# Patient Record
Sex: FEMALE | Race: WHITE | Employment: FULL TIME | URBAN - METROPOLITAN AREA
[De-identification: names, ages, dates, MRNs, and addresses within clinical notes are randomized per-mention and may not be internally consistent; named-entity substitution may affect disease eponyms.]

---

## 2017-02-17 DIAGNOSIS — Z79.4 UNCONTROLLED TYPE 2 DIABETES MELLITUS WITH HYPERGLYCEMIA, WITH LONG-TERM CURRENT USE OF INSULIN (HCC): ICD-10-CM

## 2017-02-17 DIAGNOSIS — E11.65 UNCONTROLLED TYPE 2 DIABETES MELLITUS WITH HYPERGLYCEMIA, WITH LONG-TERM CURRENT USE OF INSULIN (HCC): ICD-10-CM

## 2017-04-04 ENCOUNTER — TELEPHONE (OUTPATIENT)
Dept: ENDOCRINOLOGY | Age: 42
End: 2017-04-04

## 2017-04-04 DIAGNOSIS — Z79.4 UNCONTROLLED TYPE 2 DIABETES MELLITUS WITH HYPERGLYCEMIA, WITH LONG-TERM CURRENT USE OF INSULIN (HCC): ICD-10-CM

## 2017-04-04 DIAGNOSIS — E11.65 UNCONTROLLED TYPE 2 DIABETES MELLITUS WITH HYPERGLYCEMIA, WITH LONG-TERM CURRENT USE OF INSULIN (HCC): ICD-10-CM

## 2017-04-04 RX ORDER — INSULIN GLARGINE 100 [IU]/ML
INJECTION, SOLUTION SUBCUTANEOUS
Qty: 15 ML | Refills: 6 | Status: SHIPPED | OUTPATIENT
Start: 2017-04-04 | End: 2017-04-10 | Stop reason: CLARIF

## 2017-04-06 NOTE — TELEPHONE ENCOUNTER
Pt insurance is not covering the lantus; she needs levemir or tresiba sent in. She also asked if the needles can be sent as well.

## 2017-04-07 RX ORDER — INSULIN GLARGINE 100 [IU]/ML
INJECTION, SOLUTION SUBCUTANEOUS
Qty: 15 ML | Refills: 6 | OUTPATIENT
Start: 2017-04-07

## 2017-04-11 RX ORDER — INSULIN DEGLUDEC 200 U/ML
10-16 INJECTION, SOLUTION SUBCUTANEOUS
Qty: 15 ML | Refills: 0 | Status: SHIPPED | OUTPATIENT
Start: 2017-04-11 | End: 2017-08-30

## 2017-04-11 NOTE — TELEPHONE ENCOUNTER
Lantus no longer covered by insurance. Preferred insulin Brenna Brewer or Candida. Patient is completely out of insulin.

## 2017-08-30 ENCOUNTER — OFFICE VISIT (OUTPATIENT)
Dept: OBGYN CLINIC | Age: 42
End: 2017-08-30

## 2017-08-30 VITALS
HEIGHT: 72 IN | DIASTOLIC BLOOD PRESSURE: 66 MMHG | RESPIRATION RATE: 19 BRPM | SYSTOLIC BLOOD PRESSURE: 103 MMHG | BODY MASS INDEX: 38.87 KG/M2 | HEART RATE: 79 BPM | WEIGHT: 287 LBS

## 2017-08-30 DIAGNOSIS — Z01.419 WELL FEMALE EXAM WITH ROUTINE GYNECOLOGICAL EXAM: ICD-10-CM

## 2017-08-30 DIAGNOSIS — Z11.51 SPECIAL SCREENING EXAMINATION FOR HUMAN PAPILLOMAVIRUS (HPV): ICD-10-CM

## 2017-08-30 DIAGNOSIS — Z11.3 SCREEN FOR STD (SEXUALLY TRANSMITTED DISEASE): ICD-10-CM

## 2017-08-30 DIAGNOSIS — Z12.31 SCREENING MAMMOGRAM, ENCOUNTER FOR: Primary | ICD-10-CM

## 2017-08-30 RX ORDER — INSULIN GLARGINE 100 [IU]/ML
INJECTION, SOLUTION SUBCUTANEOUS
Refills: 6 | COMMUNITY
Start: 2017-07-21 | End: 2017-11-10 | Stop reason: ALTCHOICE

## 2017-08-30 RX ORDER — NORGESTIMATE AND ETHINYL ESTRADIOL 0.25-0.035
1 KIT ORAL DAILY
Qty: 3 DOSE PACK | Refills: 3 | Status: SHIPPED | OUTPATIENT
Start: 2017-08-30 | End: 2018-06-15 | Stop reason: ALTCHOICE

## 2017-08-30 RX ORDER — PANTOPRAZOLE SODIUM 40 MG/1
TABLET, DELAYED RELEASE ORAL
Refills: 1 | Status: ON HOLD | COMMUNITY
Start: 2017-07-24 | End: 2020-02-04

## 2017-08-30 NOTE — PROGRESS NOTES
Mauricio Whitfield is a No obstetric history on file. ,  40 y.o. female [facility] whose LMP was on 8/9/2017 who presents for her annual checkup. She is having significant desire for contraception. She also requests STD testing today. Menstrual status:    Her periods are moderate in flow. She is using three to five pads or tampons per day, usually regular and last 26-30 days. She denies dysmenorrhea. She reports no premenstrual symptoms. The patient is not using HRT. Contraception:    The current method of family planning is none. Sexual history:    She  has no sexual activity history on file. Medical conditions:    Since her last annual GYN exam about three or more years ago, she has had the following changes in her health history: gastric sleeve and she has lost 70 pounds      Pap and Mammogram History:    Her most recent Pap smear was negative and HPV positive obtained 3 year(s) ago. The patient has never had a mammogram.    Breast Cancer History/Substance Abuse:    She has no family history of breast cancer. Osteoporosis History:    Family history does not include a first or second degree relative with osteopenia or osteoporosis. A bone density scan has not been previously obtained. Past Medical History:   Diagnosis Date    Broken foot 11/11    DM [facility]diabetes mellitus) ([facility] [address]     Headache      Past Surgical History:   Procedure Laterality Date    HX OTHER SURGICAL  10/2016    gastric sleeve     HX OVARIAN CYST REMOVAL      HX TONSILLECTOMY       Current Outpatient Prescriptions   Medication Sig Dispense Refill    norgestimate-ethinyl estradiol ([facility], ,) 0.25-35 mg-mcg tab Take 1 Tab by mouth daily. 3 Dose Pack 3    olmesartan-hydroCHLOROthiazide (BENICAR HCT) 40-12.5 mg per tablet TAKE 1 TABLET BY MOUTH EVERY DAY 90 Tab 6    spironolactone (ALDACTONE) 50 mg tablet Take 1 Tab by mouth two (2) times a day.  180 Tab 4    glucose blood VI test strips (ONETOUCH ULTRA TEST) strip Use to check blood sugar 4 times daily. Dx code E11.65 200 Strip 11    Lancets (ONE TOUCH DELICA) misc Use to check blood sugar 4 times daily. Dx code E11.65 200 Each 11    Blood-Glucose Meter (ONETOUCH ULTRAMINI) monitoring kit Use to check blood sugar 4 times daily. Dx code E11.65 1 Kit 0    BD INSULIN PEN NEEDLE UF SHORT 31 X 5/16 \" ndle USE AS DIRECTED 4 TIMES A  Package 6    pantoprazole (PROTONIX) 40 mg tablet TK 1 T PO D  1    LANTUS SOLOSTAR 100 unit/mL (3 mL) inpn ADM 10 TO 16 UNITS SC HS  6    insulin degludec (TRESIBA FLEXTOUCH U-200) 200 unit/mL (3 mL) inpn 10-16 Units by SubCUTAneous route nightly. 15 mL 0    omeprazole (PRILOSEC) 40 mg capsule Take 1 Cap by mouth daily. 0    fluconazole (DIFLUCAN) 150 mg tablet Take 1 tablet by mouth for three days 3 tablet 1     Allergies: Review of patient's allergies indicates no known allergies. Social History     Social History    Marital status: SINGLE     Spouse name: N/A    Number of children: N/A    Years of education: N/A     Occupational History    Not on file. Social History Main Topics    Smoking status: Never Smoker    Smokeless tobacco: Never Used    Alcohol use Yes      Comment: socially    Drug use: No    Sexual activity: Not on file     Other Topics Concern    Not on file     Social History Narrative     Tobacco History:  reports that she has never smoked. She has never used smokeless tobacco.  Alcohol Abuse:  reports that she drinks alcohol. Drug Abuse:  reports that she does not use illicit drugs.   Patient Active Problem List   Diagnosis Code    DM (diabetes mellitus) ([facility] [address] E11.9    Headache R51    Encounter for long-term (current) use of insulin ([facility] [address] Z79.4    Hypertriglyceridemia E78.1    Essential hypertension I10    BMI 45.0-49.9, adult ([facility] [address] Z68.42         Review of Systems - History obtained from the patient  Constitutional: negative for weight loss, fever, night sweats  HEENT: negative for hearing loss, earache, congestion, snoring, sorethroat  CV: negative for chest pain, palpitations, edema  Resp: negative for cough, shortness of breath, wheezing  GI: negative for change in bowel habits, abdominal pain, black or bloody stools  : negative for frequency, dysuria, hematuria, vaginal discharge  MSK: negative for back pain, joint pain, muscle pain  Breast: negative for breast lumps, nipple discharge, galactorrhea  Skin :negative for itching, rash, hives  Neuro: negative for dizziness, headache, confusion, weakness  Psych: negative for anxiety, depression, change in mood  Heme/lymph: negative for bleeding, bruising, pallor    Physical Exam    Visit Vitals    /66 (BP 1 Location: Left arm, BP Patient Position: Sitting)    Pulse 79    Resp 19    Ht 6' 1\" (1.854 m)    Wt 287 lb (130.2 kg)    LMP 08/09/2017 (Exact Date)    BMI 37.87 kg/m2     Constitutional  · Appearance: well-nourished, well developed, alert, in no acute distress    HENT  · Head and Face: appears normal    Neck  · Inspection/Palpation: normal appearance, no masses or tenderness  Lymph Nodes: no lymphadenopathy present    Chest  · Respiratory Effort: breathing normal    Breasts  · Inspection of Breasts: breasts symmetrical, no skin changes, no discharge present, nipple appearance normal, no skin retraction present  · Palpation of Breasts and Axillae: no masses present on palpation, no breast tenderness  · Axillary Lymph Nodes: no lymphadenopathy present    Gastrointestinal  · Abdominal Examination: abdomen non-tender to palpation, normal bowel sounds, no masses present  · Liver and spleen: no hepatomegaly present, spleen not palpable  · Hernias: no hernias identified    Skin  · General Inspection: no rash, no lesions identified    Neurologic/Psychiatric  · Mental Status:  · Orientation: grossly oriented to person, place and time  · Mood and Affect: mood normal, affect appropriate    Genitourinary  · External Genitalia: normal appearance for age, no discharge present, no tenderness present, no inflammatory lesions present, no masses present, no atrophy present  · Vagina: normal vaginal vault without central or paravaginal defects, no discharge present, no inflammatory lesions present, no masses present  · Bladder: non-tender to palpation  · Urethra: appears normal  · Cervix: normal   · Uterus: normal size, shape and consistency  · Adnexa: no adnexal tenderness present, no adnexal masses present  · Perineum: perineum within normal limits, no evidence of trauma, no rashes or skin lesions present  · Anus: anus within normal limits, no hemorrhoids present  · Inguinal Lymph Nodes: no lymphadenopathy present    Assessment:  Routine gynecologic examination  Her current medical status is satisfactory with no evidence of significant gynecologic issues. Plan:  Counseled re: diet, exercise, healthy lifestyle  Return for yearly wellness visits  Rec annual mammogram  Patient Verbalized understanding  STD testing sent  She will start OCPs.

## 2017-08-30 NOTE — PATIENT INSTRUCTIONS

## 2017-08-31 LAB
COMMENT, 144067: NORMAL
HBV SURFACE AG SERPL QL IA: NEGATIVE
HCV AB S/CO SERPL IA: <0.1 S/CO RATIO (ref 0–0.9)
HIV 1+2 AB+HIV1 P24 AG SERPL QL IA: NON REACTIVE
HSV1 IGG SER IA-ACNC: 31.7 INDEX (ref 0–0.9)
HSV2 IGG SER IA-ACNC: 3.81 INDEX (ref 0–0.9)
RPR SER QL: NON REACTIVE

## 2017-09-05 LAB
C TRACH RRNA CVX QL NAA+PROBE: NEGATIVE
CYTOLOGIST CVX/VAG CYTO: NORMAL
CYTOLOGY CVX/VAG DOC THIN PREP: NORMAL
CYTOLOGY HISTORY:: NORMAL
DX ICD CODE: NORMAL
DX ICD CODE: NORMAL
HPV I/H RISK 1 DNA CVX QL PROBE+SIG AMP: NEGATIVE
Lab: NORMAL
Lab: NORMAL
N GONORRHOEA RRNA CVX QL NAA+PROBE: NEGATIVE
OTHER STN SPEC: NORMAL
PATH REPORT.FINAL DX SPEC: NORMAL
STAT OF ADQ CVX/VAG CYTO-IMP: NORMAL

## 2017-09-05 NOTE — PROGRESS NOTES
Your pap is normal but does show yeast. If your having symptoms you can use over the counter vaginal yeast med or call for a prescription oral yeast medicine to try. Your STD testing is all negative except for past evidence of infection with Type 1 & Type 2 herpes.

## 2017-11-10 DIAGNOSIS — E11.65 TYPE 2 DIABETES MELLITUS WITH HYPERGLYCEMIA, WITHOUT LONG-TERM CURRENT USE OF INSULIN (HCC): Primary | ICD-10-CM

## 2017-11-10 RX ORDER — INSULIN DEGLUDEC 100 U/ML
INJECTION, SOLUTION SUBCUTANEOUS
Qty: 3 PEN | Refills: 0 | Status: SHIPPED | OUTPATIENT
Start: 2017-11-10 | End: 2018-02-07 | Stop reason: SDUPTHER

## 2017-11-10 NOTE — TELEPHONE ENCOUNTER
Spoke with patient and she needs a PA completed on Lantus. Checked insurance formulary and [place] or Basaglar are preferred. She stated she is out of insulin and has been for 3 days. Patient stated her PCP started her on fast acting insulin so she has been taking that medication. Patient has not been seen by Cierra Rose since 10/26/16. Patient will schedule appointment now in regards to diabetes and a newly diagnosed adrenal nodule.

## 2017-11-10 NOTE — TELEPHONE ENCOUNTER
Attempted to call patient in regards to new prescription being sent to pharmacy and patient can come by office to get a savings packet for the medication. Left message and call back number.

## 2017-12-05 ENCOUNTER — OFFICE VISIT (OUTPATIENT)
Dept: ENDOCRINOLOGY | Age: 42
End: 2017-12-05

## 2017-12-05 VITALS
HEART RATE: 78 BPM | BODY MASS INDEX: 38.9 KG/M2 | OXYGEN SATURATION: 98 % | DIASTOLIC BLOOD PRESSURE: 88 MMHG | RESPIRATION RATE: 16 BRPM | TEMPERATURE: 98.7 F | WEIGHT: 287.2 LBS | SYSTOLIC BLOOD PRESSURE: 140 MMHG | HEIGHT: 72 IN

## 2017-12-05 DIAGNOSIS — Z13.29 THYROID DISORDER SCREEN: ICD-10-CM

## 2017-12-05 DIAGNOSIS — E78.2 MIXED HYPERLIPIDEMIA: ICD-10-CM

## 2017-12-05 DIAGNOSIS — I10 ESSENTIAL HYPERTENSION: ICD-10-CM

## 2017-12-05 DIAGNOSIS — E11.8 TYPE 2 DIABETES MELLITUS WITH COMPLICATION, UNSPECIFIED LONG TERM INSULIN USE STATUS: Primary | ICD-10-CM

## 2017-12-05 DIAGNOSIS — E27.8 ADRENAL NODULE (HCC): ICD-10-CM

## 2017-12-05 LAB
GLUCOSE POC: 95 MG/DL
HBA1C MFR BLD HPLC: 7.1 %

## 2017-12-05 RX ORDER — DEXAMETHASONE 1 MG/1
TABLET ORAL
Qty: 1 TAB | Refills: 0 | Status: SHIPPED | OUTPATIENT
Start: 2017-12-05 | End: 2018-04-16 | Stop reason: SDUPTHER

## 2017-12-05 NOTE — PATIENT INSTRUCTIONS
Check blood sugars only once a day by rotation as follows    First day - before b-fast  Second day- before lunch  Third day- before dinner  Fourth day before bed time    After 4 days go back to same rotaion        Tresiba   insulin 30  units at bed time      Start on Metformin 500 mg one  pill  Twice a day  With  meals       novolog by sliding scale  as folllows with meals:    150-200 mg 2 units    201-250 mg 4 units    251-300 mg 6 units    301-350 mg 8 units    351-400 mg 10 units    401-450 mg 12 units    451-500 mg 14 units    Less than 70 mg NO INSULIN     ---------------------------------------------------------------------------------------------------------------------------------------------------      11 pm - decadron 1 mg and come in the AM for fasting labs

## 2017-12-05 NOTE — MR AVS SNAPSHOT
Visit Information Date & Time Provider Department Dept. Phone Encounter #  
 12/5/2017  3:30 PM Partha Childers MD [facility] Diabetes & Endocrinology [phone] [id number] Follow-up Instructions Return in about 3 months (around 3/5/2018). Upcoming Health Maintenance Date Due  
 FOOT EXAM Q1 6/13/1986 Pneumococcal 19-64 Medium Risk (1 of 1 - PPSV23) 6/13/1995 DTaP/Tdap/Td series (1 - Tdap) 6/13/1997 MICROALBUMIN Q1 3/11/2017 LIPID PANEL Q1 3/11/2017 HEMOGLOBIN A1C Q6M 4/26/2017 Influenza Age 5 to Adult 8/1/2017 EYE EXAM RETINAL OR DILATED Q1 5/23/2018 PAP AKA CERVICAL CYTOLOGY 8/30/2020 Allergies as of 12/5/2017  Review Complete On: 12/5/2017 By: Partha Childers MD  
 No Known Allergies Current Immunizations  Never Reviewed No immunizations on file. Not reviewed this visit You Were Diagnosed With   
  
 Codes Comments Type 2 diabetes mellitus with complication, unspecified long term insulin use status (HCC)    -  Primary ICD-10-CM: E11.8 ICD-9-CM: 250.90 Adrenal nodule ([facility] [address]     ICD-10-CM: E27.9 ICD-9-CM: 255.8 Class 2 obesity due to excess calories with serious comorbidity and body mass index (BMI) of 37.0 to 37.9 in adult     ICD-10-CM: E66.09, G58.32 ICD-9-CM: 278.00, V85.37 Vitals BP Pulse Temp Resp Height(growth percentile) Weight(growth percentile) 140/88 (BP 1 Location: Right arm, BP Patient Position: Sitting) 78 98.7 °F (37.1 °C) (Oral) 16 6' 1\" (1.854 m) 287 lb 3.2 oz (130.3 kg) LMP SpO2 BMI OB Status Smoking Status 11/30/2017 98% 37.89 kg/m2 Having regular periods Never Smoker BMI and BSA Data Body Mass Index Body Surface Area  
 37.89 kg/m 2 2.59 m 2 Preferred Pharmacy Pharmacy Name Phone [id number] [address] [phone] Your Updated Medication List  
  
   
This list is accurate as of: 12/5/17  4:28 PM.  Always use your most recent med list.  
  
  
  
  
 BD INSULIN PEN NEEDLE UF SHORT 31 gauge x 5/16\" Ndle Generic drug:  Insulin Needles (Disposable) USE AS DIRECTED 4 TIMES A DAY Blood-Glucose Meter monitoring kit Commonly known as:  Abelardo Jermaine Use to check blood sugar 4 times daily. Dx code E11.65  
  
 glucose blood VI test strips strip Commonly known as:  ONETOUCH ULTRA TEST Use to check blood sugar 4 times daily. Dx code E11.65  
  
 insulin degludec 100 unit/mL (3 mL) Inpn Commonly known as:  TRESIBA FLEXTOUCH U-100 Inject 10-16 units subcutaneous route at bedtime. Lancets Misc Commonly known as: One Touch Dale Zuniga Use to check blood sugar 4 times daily. Dx code E11.65  
  
 norgestimate-ethinyl estradiol 0.25-35 mg-mcg Tab Commonly known as:  [facility] (28) Take 1 Tab by mouth daily. olmesartan-hydroCHLOROthiazide 40-12.5 mg per tablet Commonly known as:  BENICAR HCT  
TAKE 1 TABLET BY MOUTH EVERY DAY  
  
 pantoprazole 40 mg tablet Commonly known as:  PROTONIX TK 1 T PO D  
  
 spironolactone 50 mg tablet Commonly known as:  ALDACTONE Take 1 Tab by mouth two (2) times a day. We Performed the Following AMB POC GLUCOSE BLOOD, BY GLUCOSE MONITORING DEVICE [09009 CPT(R)] AMB POC HEMOGLOBIN A1C [06436 CPT(R)] Follow-up Instructions Return in about 3 months (around 3/5/2018). Patient Instructions Check blood sugars only once a day by rotation as follows First day - before b-fast 
Second day- before lunch Third day- before dinner Fourth day before bed time After 4 days go back to same rotaion Tresiba   insulin 30  units at bed time Start on Metformin 500 mg one  pill  Twice a day  With  meals  
 
 
novolog by sliding scale  as folllows with meals: 
 
150-200 mg 2 units 201-250 mg 4 units 251-300 mg 6 units 301-350 mg 8 units 351-400 mg 10 units 401-450 mg 12 units 451-500 mg 14 units Less than 70 mg NO INSULIN  
 
--------------------------------------------------------------------------------------------------------------------------------------------------- 
 
 
11 pm - decadron 1 mg and come in the AM for fasting labs Introducing [address]! Dear Jyoti Sanches: Thank you for requesting a [employer] account. Our records indicate that you already have an active [employer] account. You can access your account anytime at [email or web address] Did you know that you can access your hospital and ER discharge instructions at any time in [employer]? You can also review all of your test results from your hospital stay or ER visit. Additional Information If you have questions, please visit the Frequently Asked Questions section of the [employer] website at [email or web address] Remember, Mobile[employer] is NOT to be used for urgent needs. For medical emergencies, dial 911. Now available from your iPhone and Android! Please provide this summary of care documentation to your next provider. Your primary care clinician is listed as [employer]. If you have any questions after today's visit, please call [phone].

## 2017-12-05 NOTE — PROGRESS NOTES
Chief Complaint   Patient presents with    Diabetes     1. Have you been to the ER, urgent care clinic since your last visit? Hospitalized since your last visit? Yes, 10/28/17[facility] [address] [facility] Infection caused pt to go in septic shock. Nick Oakley when they did a cat scan a nodule was seen on her adrenal gland she was told to follow up for additional test    2. Have you seen or consulted any other health care providers outside of the [address] [place] [address] since your last visit? Include any pap smears or colon screening.  No      Wt Readings from Last 3 Encounters:   12/05/17 287 lb 3.2 oz (130.3 kg)   08/30/17 287 lb (130.2 kg)   10/26/16 302 lb (137 kg)     Temp Readings from Last 3 Encounters:   12/05/17 98.7 °F (37.1 °C) (Oral)   10/26/16 97 °F (36.1 °C) (Oral)   03/18/16 97 °F (36.1 °C) (Oral)     BP Readings from Last 3 Encounters:   12/05/17 140/88   08/30/17 103/66   10/26/16 133/79     Pulse Readings from Last 3 Encounters:   12/05/17 78   08/30/17 79   10/26/16 76     Pt do not have meter  Pt had a foot and eye exam

## 2017-12-05 NOTE — PROGRESS NOTES
HISTORY OF PRESENT ILLNESS  Jonh Gómez is a 40 y.o. female. HPI  Patient here for f/u visit for Type 2 diabetes mellitus   And new diagnosis, for adrenal nodule   after last visit from Oct   2016     Has not followed up for more than a year     She is s/p  bariatric surgery done , gastric sleeve , by Dr. Tamela Gonzalez on oct 17 2016   She lost 45 lbs post surgery       She had 15 lbs since last visit     She had  Multiple infections, the major one was cholycystectomy   Had multiple imaging test done , and that showed incidental nodule on adrenal glands     Every time she joins the gym, she fractures   She has charcots joints       Review of Systems   Constitutional: Negative. HENT: Negative. Eyes: Negative for pain and redness. Respiratory: Negative. Cardiovascular: Negative for chest pain, palpitations and leg swelling. Gastrointestinal: Negative. Negative for constipation. Genitourinary: Negative. Musculoskeletal: Negative for myalgias. Skin: Negative. Neurological: Negative. Endo/Heme/Allergies: Negative. Psychiatric/Behavioral: Negative for depression and memory loss. The patient does not have insomnia. Physical Exam   Constitutional: She is oriented to person, place, and time. She appears well-developed and well-nourished. HENT: Body mass index is 37.89 kg/(m^2). Head: Normocephalic. Eyes: Conjunctivae and EOM are normal. Pupils are equal, round, and reactive to light. Neck: Normal range of motion. Neck supple. No JVD present. No tracheal deviation present. No thyromegaly present. Cardiovascular: Normal rate, regular rhythm and normal heart sounds. No murmur heard. Pulmonary/Chest: Breath sounds normal.   Abdominal: Soft. Bowel sounds are normal.   Musculoskeletal: Normal range of motion. Lymphadenopathy:   She has no cervical adenopathy. Neurological: She is alert and oriented to person, place, and time. She has normal reflexes. Skin: Skin is warm. Psychiatric: She has a normal mood and affect. She had A1c in [facility] [place] 7.5 %    Oct 28 thru nov 3   2017          ASSESSMENT and PLAN    1. Type 2 DM, un controlled : A1c is   7.5 %      From  Oct 2017      Compared to   7.2 %  Today   From  Oct 2016 compared to   8.5 %     From   March 2016  Compared to 8.4 %   From dec 2015   compared to    10.2 %     From oct 2015 comapred to  7.4 %  From June 2014 compared to  9.6 % from [facility] 2014 compared to  7.7 % march 2013  Compared to  9.9 % March 2012 compared to 8.7% in march 2010    She is s/p gastric sleeve surgery  -  Oct 2016   She has measurable c-pep , definitely type 2 diabetic   ? Normoglycemic DKA , unexpected on her hospitalization  At [address] while on [employer]   Post bariatric surgery , she had to be on insulin      on  Lantus  30 units  at bedtime   pcp started on meal time humalog but I suggested that she use it PRN ( at times of infection)  Off  Bydureon,  but is a good option if weight loss is needed   Discussed with her the start on metformin    Patient is advised about checking blood sugars 4 times a day and maintaining log book. .   lab results and schedule of future lab studies reviewed with patient       2. Hypoglycemia : Educated on treating the hypoglycemia. Discussed insulin use     3. HTN : well controlled on  benicar- hctz,  Patient is educated about importance of compliance with anti-hypertensives especially ARB/ACEI     4. Dyslipidemia : lipids are to checked , as it is due after gastric sleeve     . Patient is educated about benefits and adverse effects of statins and explained how benefits outweigh risk. 5. use of aspirin to prevent MI and TIA's discussed     6. PCOS- off  spironolactone   She is off  ocp , as she could get more fertile, She is educated about increased fertility with weight loss        7.  Obesity : made up her mind to  undergo bariatric surgery, sleeve versus bypass   Body mass index is 37.89 kg/(m^2). S/p gastric sleeve surgery        8. Adrenal nodule :   Incidental finding when CT was doen for gallbladder problems   Obtain records   explained the management plan by size, radiology phenotype and active   Will do labs for this nodule     > 50 % of time is spent on counseling   Patient voiced understanding her plan of care         F/u  in 6 months

## 2018-02-07 DIAGNOSIS — E11.65 TYPE 2 DIABETES MELLITUS WITH HYPERGLYCEMIA, WITHOUT LONG-TERM CURRENT USE OF INSULIN (HCC): ICD-10-CM

## 2018-02-07 RX ORDER — INSULIN DEGLUDEC INJECTION 100 U/ML
INJECTION, SOLUTION SUBCUTANEOUS
Qty: 15 ML | Refills: 6 | Status: SHIPPED | OUTPATIENT
Start: 2018-02-07 | End: 2018-04-16 | Stop reason: SDUPTHER

## 2018-02-26 LAB
ALBUMIN SERPL-MCNC: 3.9 G/DL (ref 3.5–5.5)
ALBUMIN/CREAT UR: 4.3 MG/G CREAT (ref 0–30)
ALBUMIN/GLOB SERPL: 1.5 {RATIO} (ref 1.2–2.2)
ALDOST SERPL-MCNC: 5.4 NG/DL (ref 0–30)
ALP SERPL-CCNC: 65 IU/L (ref 39–117)
ALT SERPL-CCNC: 14 IU/L (ref 0–32)
AST SERPL-CCNC: 11 IU/L (ref 0–40)
BILIRUB SERPL-MCNC: 0.3 MG/DL (ref 0–1.2)
BUN SERPL-MCNC: 21 MG/DL (ref 6–24)
BUN/CREAT SERPL: 27 (ref 9–23)
CALCIUM SERPL-MCNC: 9.2 MG/DL (ref 8.7–10.2)
CHLORIDE SERPL-SCNC: 98 MMOL/L (ref 96–106)
CHOLEST SERPL-MCNC: 217 MG/DL (ref 100–199)
CO2 SERPL-SCNC: 22 MMOL/L (ref 18–29)
CORTIS AM PEAK SERPL-MCNC: 15 UG/DL (ref 6.2–19.4)
CREAT SERPL-MCNC: 0.78 MG/DL (ref 0.57–1)
CREAT UR-MCNC: 120.5 MG/DL
EST. AVERAGE GLUCOSE BLD GHB EST-MCNC: 220 MG/DL
GFR SERPLBLD CREATININE-BSD FMLA CKD-EPI: 109 ML/MIN/1.73
GFR SERPLBLD CREATININE-BSD FMLA CKD-EPI: 95 ML/MIN/1.73
GLOBULIN SER CALC-MCNC: 2.6 G/DL (ref 1.5–4.5)
GLUCOSE SERPL-MCNC: 281 MG/DL (ref 65–99)
HBA1C MFR BLD: 9.3 % (ref 4.8–5.6)
HDLC SERPL-MCNC: 43 MG/DL
INTERPRETATION, 910389: NORMAL
LDLC SERPL CALC-MCNC: 106 MG/DL (ref 0–99)
Lab: NORMAL
METANEPH FREE SERPL-MCNC: <10 PG/ML (ref 0–62)
MICROALBUMIN UR-MCNC: 5.2 UG/ML
NORMETANEPHRINE SERPL-MCNC: 26 PG/ML (ref 0–145)
POTASSIUM SERPL-SCNC: 4.7 MMOL/L (ref 3.5–5.2)
PROT SERPL-MCNC: 6.5 G/DL (ref 6–8.5)
RENIN PLAS-CCNC: 4.87 NG/ML/HR (ref 0.17–5.38)
SODIUM SERPL-SCNC: 136 MMOL/L (ref 134–144)
TRIGL SERPL-MCNC: 339 MG/DL (ref 0–149)
TSH SERPL DL<=0.005 MIU/L-ACNC: 0.2 UIU/ML (ref 0.45–4.5)
VLDLC SERPL CALC-MCNC: 68 MG/DL (ref 5–40)

## 2018-04-16 ENCOUNTER — OFFICE VISIT (OUTPATIENT)
Dept: ENDOCRINOLOGY | Age: 43
End: 2018-04-16

## 2018-04-16 VITALS
HEART RATE: 82 BPM | SYSTOLIC BLOOD PRESSURE: 148 MMHG | TEMPERATURE: 97.5 F | BODY MASS INDEX: 38.13 KG/M2 | HEIGHT: 72 IN | OXYGEN SATURATION: 100 % | DIASTOLIC BLOOD PRESSURE: 80 MMHG | RESPIRATION RATE: 18 BRPM | WEIGHT: 281.5 LBS

## 2018-04-16 DIAGNOSIS — E27.8 ADRENAL NODULE (HCC): ICD-10-CM

## 2018-04-16 DIAGNOSIS — Z79.4 UNCONTROLLED TYPE 2 DIABETES MELLITUS WITH HYPERGLYCEMIA, WITH LONG-TERM CURRENT USE OF INSULIN (HCC): Primary | ICD-10-CM

## 2018-04-16 DIAGNOSIS — Z98.84 S/P BARIATRIC SURGERY: ICD-10-CM

## 2018-04-16 DIAGNOSIS — I10 ESSENTIAL HYPERTENSION: ICD-10-CM

## 2018-04-16 DIAGNOSIS — E11.65 TYPE 2 DIABETES MELLITUS WITH HYPERGLYCEMIA, WITHOUT LONG-TERM CURRENT USE OF INSULIN (HCC): ICD-10-CM

## 2018-04-16 DIAGNOSIS — E11.65 UNCONTROLLED TYPE 2 DIABETES MELLITUS WITH HYPERGLYCEMIA, WITH LONG-TERM CURRENT USE OF INSULIN (HCC): Primary | ICD-10-CM

## 2018-04-16 RX ORDER — METFORMIN HYDROCHLORIDE 500 MG/1
TABLET ORAL
Qty: 60 TAB | Refills: 6 | Status: SHIPPED | OUTPATIENT
Start: 2018-04-16 | End: 2019-10-15 | Stop reason: SDUPTHER

## 2018-04-16 RX ORDER — INSULIN DEGLUDEC 100 U/ML
INJECTION, SOLUTION SUBCUTANEOUS
Qty: 15 ML | Refills: 6 | Status: SHIPPED | OUTPATIENT
Start: 2018-04-16 | End: 2018-05-14 | Stop reason: SDUPTHER

## 2018-04-16 RX ORDER — LANCETS
EACH MISCELLANEOUS
Qty: 200 EACH | Refills: 11 | Status: SHIPPED | OUTPATIENT
Start: 2018-04-16 | End: 2019-05-09 | Stop reason: CLARIF

## 2018-04-16 RX ORDER — DEXAMETHASONE 1 MG/1
TABLET ORAL
Qty: 1 TAB | Refills: 0 | Status: SHIPPED | OUTPATIENT
Start: 2018-04-16 | End: 2018-05-04 | Stop reason: SDUPTHER

## 2018-04-16 RX ORDER — INSULIN ASPART 100 [IU]/ML
INJECTION, SOLUTION INTRAVENOUS; SUBCUTANEOUS
Qty: 15 ML | Refills: 6 | Status: SHIPPED | OUTPATIENT
Start: 2018-04-16 | End: 2018-05-24 | Stop reason: SDUPTHER

## 2018-04-16 NOTE — PROGRESS NOTES
HISTORY OF PRESENT ILLNESS  Cee Chapman is a 40 y.o. female. HPI  Patient here for f/u visit for Type 2 diabetes mellitus   And new diagnosis, for adrenal nodule   after last visit from Dec  2017    She had right second toe infection and stayed in hospital in jan 2018   She had I/v antibiotics       She got no meter   Sugars are high     She had allergic reaction  To vancomycin and had to get steroids for it   She received novolog     She ran out of insulins           Old history   Has not followed up for more than a year     She is s/p  bariatric surgery done , gastric sleeve , by Dr. Sobia Spring on oct 17 2016   She lost 45 lbs post surgery       She had 15 lbs since last visit     She had  Multiple infections, the major one was cholycystectomy   Had multiple imaging test done , and that showed incidental nodule on adrenal glands     Every time she joins the gym, she fractures   She has charcots joints       Review of Systems   Constitutional: Negative. HENT: Negative. Eyes: Negative for pain and redness. Respiratory: Negative. Cardiovascular: Negative for chest pain, palpitations and leg swelling. Gastrointestinal: Negative. Negative for constipation. Genitourinary: Negative. Musculoskeletal: Negative for myalgias. Skin: Negative. Neurological: Negative. Endo/Heme/Allergies: Negative. Psychiatric/Behavioral: Negative for depression and memory loss. The patient does not have insomnia. Physical Exam   Constitutional: She is oriented to person, place, and time. She appears well-developed and well-nourished. HENT: Body mass index is 37.14 kg/(m^2). Head: Normocephalic. Eyes: Conjunctivae and EOM are normal. Pupils are equal, round, and reactive to light. Neck: Normal range of motion. Neck supple. No JVD present. No tracheal deviation present. No thyromegaly present. Cardiovascular: Normal rate, regular rhythm and normal heart sounds. No murmur heard.    Pulmonary/Chest: Breath sounds normal.   Abdominal: Soft. Bowel sounds are normal.   Musculoskeletal: Normal range of motion. Lymphadenopathy:   She has no cervical adenopathy. Neurological: She is alert and oriented to person, place, and time. She has normal reflexes. Skin: Skin is warm. Psychiatric: She has a normal mood and affect. Diabetic foot exam: April 2018    Left: Reflexes nd     Vibratory sensation diminished    Proprioception nd   Sharp/dull discrimination nd    Filament test reduced sensation with micro filament   Pulse DP: 2+ (normal)   Pulse PT: nd   Deformities: None, dorsiflexed toes, rough plantar skin   Right: Reflexes nd   Vibratory sensation diminished   Proprioception nd   Sharp/dull discrimination nd   Filament test reduced sensation with micro filament   Pulse DP: 2+ (normal)   Pulse PT: nd   Deformities: Yes - dorsiflexed toes, ulcer healed on second toe ( charcots joints )          She had A1c in [facility] 7.5 %    Oct 28 thru nov 3   2017          ASSESSMENT and PLAN    1.  Type 2 DM, un controlled : A1c is    9.3 %      From      Feb 2018    Compared to    7.5 %      From  Oct 2017      Compared to   7.2 %  Today   From  Oct 2016 compared to   8.5 %     From   March 2016  Compared to 8.4 %   From dec 2015   compared to    10.2 %     From oct 2015 comapred to  7.4 %  From June 2014 compared to  9.6 % from [facility] 2014 compared to  7.7 % march 2013  Compared to  9.9 % March 2012 compared to 8.7% in march 2010    She is s/p gastric sleeve surgery  -  Oct 2016   She has measurable c-pep , definitely type 2 diabetic    (? Normoglycemic DKA , unexpected on her hospitalization  At [address] while on [address] [facility] )  Post bariatric surgery , she had to be on insulin   Increased   Lantus  And started on meal time insulin     She did not bring her meter along to  the glycemic insight    Off  Bydureon,  but is a good option if weight loss is needed   Discussed with her the start on metformin    Patient is advised about checking blood sugars 4 times a day and maintaining log book. .   lab results and schedule of future lab studies reviewed with patient       2. Hypoglycemia : Educated on treating the hypoglycemia. Discussed insulin use     3. HTN : well controlled on  benicar- hctz,  Patient is educated about importance of compliance with anti-hypertensives especially ARB/ACEI     4. Dyslipidemia : lipids are to checked , as it is due after gastric sleeve     . Patient is educated about benefits and adverse effects of statins and explained how benefits outweigh risk. 5. use of aspirin to prevent MI and TIA's discussed     6. PCOS- off  spironolactone   She is off  ocp , as she could get more fertile, She is educated about increased fertility with weight loss        7. Obesity : made up her mind to  undergo bariatric surgery, sleeve versus bypass   Body mass index is 37.14 kg/(m^2). S/p gastric sleeve surgery      Suggested the use of freestyle chaz, patient refused      8. Adrenal nodule :   Incidental finding when CT was doen for gallbladder problems   Obtain records   explained the management plan by size, radiology phenotype and active   Will do labs for this nodule     > 50 % of time is spent on counseling   Patient voiced understanding her plan of care         F/u  in 3  months

## 2018-04-16 NOTE — PATIENT INSTRUCTIONS
Stay on Metformin 500 mg one  pill  Twice a day  With  meals       Check blood sugars right before each meal and at bed time         Increase Tresiba   insulin 50  units at bed time      Start on novolog  At  8 units with each meal   Add additional novolog by sliding scale  as folllows with meals:    150-200 mg 2 units    201-250 mg 4 units    251-300 mg 6 units    301-350 mg 8 units    351-400 mg 10 units    401-450 mg 12 units    451-500 mg 14 units    Less than 70 mg NO INSULIN     ---------------------------------------------------------------------------------------------------------------------------------------------------      11 pm - decadron 1 mg and come in the AM for fasting labs

## 2018-04-16 NOTE — MR AVS SNAPSHOT
[address] [facility] [address] 
[phone] Patient: Mauricio Whitfield MRN: [id number]  Visit Information Date & Time Provider Department Dept. Phone Encounter #  
 2018  3:45 PM Rodriguez De Leon MD [facility] [place] [facility] [phone] [id number] Follow-up Instructions Return in about 2 months (around 2018). Upcoming Health Maintenance Date Due  
 FOOT EXAM Q1 1986 Pneumococcal 19-64 Medium Risk (1 of 1 - PPSV23) 1995 DTaP/Tdap/Td series (1 - Tdap) 1997 Influenza Age 5 to Adult 2017 EYE EXAM RETINAL OR DILATED Q1 2018 HEMOGLOBIN A1C Q6M 2018 MICROALBUMIN Q1 2019 LIPID PANEL Q1 2019 PAP AKA CERVICAL CYTOLOGY 2020 Allergies as of 2018  Review Complete On: 2018 By: Rodriguez De Leon MD  
  
 Severity Noted Reaction Type Reactions Vancomycin  2018    Rash Current Immunizations  Never Reviewed No immunizations on file. Not reviewed this visit You Were Diagnosed With   
  
 Codes Comments Uncontrolled type 2 diabetes mellitus with hyperglycemia, with long-term current use of insulin (HCC)    -  Primary ICD-10-CM: E11.65, Z79.4 ICD-9-CM: 250.02, V58.67 Adrenal nodule ([facility] [address]     ICD-10-CM: E27.9 ICD-9-CM: 255.8 Essential hypertension     ICD-10-CM: I10 
ICD-9-CM: 401.9 Vitals BP Pulse Temp Resp Height(growth percentile) Weight(growth percentile) 148/80 (BP 1 Location: Right arm, BP Patient Position: Sitting) 82 97.5 °F (36.4 °C) (Oral) 18 6' 1\" (1.854 m) 281 lb 8 oz (127.7 kg) LMP SpO2 BMI OB Status Smoking Status 2018 100% 37.14 kg/m2 Having regular periods Never Smoker Vitals History BMI and BSA Data Body Mass Index Body Surface Area  
 37.14 kg/m 2 2.56 m 2 Preferred Pharmacy Pharmacy Name Phone [id number] [address] [facility] [phone] Your Updated Medication List  
  
   
This list is accurate as of 4/16/18  4:41 PM.  Always use your most recent med list.  
  
  
  
  
 BD INSULIN PEN NEEDLE UF SHORT 31 gauge x 5/16\" Ndle Generic drug:  Insulin Needles (Disposable) USE AS DIRECTED 4 TIMES A DAY Blood-Glucose Meter monitoring kit Commonly known as:  WEPOWER Ecoer Use to check blood sugar 4 times daily. Dx code E11.65  
  
 dexamethasone 1 mg tablet Commonly known as:  DECADRON Take one tablet at 11pm before fasting labs. glucose blood VI test strips strip Commonly known as:  ONETOUCH ULTRA TEST Use to check blood sugar 4 times daily. Dx code E11.65 Lancets Misc Commonly known as: One Touch Michaelene Reece Use to check blood sugar 4 times daily. Dx code E11.65  
  
 norgestimate-ethinyl estradiol 0.25-35 mg-mcg Tab Commonly known as:  [address] [facility] () Take 1 Tab by mouth daily. olmesartan-hydroCHLOROthiazide 40-12.5 mg per tablet Commonly known as:  BENICAR HCT  
TAKE 1 TABLET BY MOUTH EVERY DAY  
  
 pantoprazole 40 mg tablet Commonly known as:  PROTONIX TK 1 T PO D  
  
 spironolactone 50 mg tablet Commonly known as:  ALDACTONE Take 1 Tab by mouth two (2) times a day. TRESIBA FLEXTOUCH U-100 100 unit/mL (3 mL) Inpn Generic drug:  insulin degludec INJECT 10-16 UNITS SUBCUTANEOUS ROUTE AT BEDTIME. Prescriptions Printed Refills  
 dexamethasone (DECADRON) 1 mg tablet 0 Sig: Take one tablet at 11pm before fasting labs. Class: Print Follow-up Instructions Return in about 2 months (around 6/16/2018). Patient Instructions Stay on Metformin 500 mg one  pill  Twice a day  With  meals Check blood sugars right before each meal and at bed time Increase Tresiba   insulin 50  units at bed time Start on novolog  At  8 units with each meal  
 Add additional novolog by sliding scale  as folllows with meals: 
 
150-200 mg 2 units 201-250 mg 4 units 251-300 mg 6 units 301-350 mg 8 units 351-400 mg 10 units 401-450 mg 12 units 451-500 mg 14 units Less than 70 mg NO INSULIN  
 
--------------------------------------------------------------------------------------------------------------------------------------------------- 
 
 
11 pm - decadron 1 mg and come in the AM for fasting labs Introducing [facility]! Dear [place]: Thank you for requesting a [employer] account. Our records indicate that you already have an active [employer] account. You can access your account anytime at [email or web address] Did you know that you can access your hospital and ER discharge instructions at any time in [employer]? You can also review all of your test results from your hospital stay or ER visit. Additional Information If you have questions, please visit the Frequently Asked Questions section of the [employer] website at [email or web address] Remember, [employer] is NOT to be used for urgent needs. For medical emergencies, dial 911. Now available from your iPhone and Android! Please provide this summary of care documentation to your next provider. Your primary care clinician is listed as [employer]. If you have any questions after today's visit, please call [phone].

## 2018-04-16 NOTE — PROGRESS NOTES
Wt Readings from Last 3 Encounters:   04/16/18 281 lb 8 oz (127.7 kg)   12/05/17 287 lb 3.2 oz (130.3 kg)   08/30/17 287 lb (130.2 kg)     Temp Readings from Last 3 Encounters:   04/16/18 97.5 °F (36.4 °C) (Oral)   12/05/17 98.7 °F (37.1 °C) (Oral)   10/26/16 97 °F (36.1 °C) (Oral)     BP Readings from Last 3 Encounters:   04/16/18 148/80   12/05/17 140/88   08/30/17 103/66     Pulse Readings from Last 3 Encounters:   04/16/18 82   12/05/17 78   08/30/17 79     Lab Results   Component Value Date/Time    Hemoglobin A1c 9.3 (H) 02/22/2018 08:11 AM    Hemoglobin A1c (POC) 7.1 12/05/2017 03:54 PM    Hemoglobin A1c, External 10.2 10/04/2015     Patient does not have meter or logbook today.

## 2018-04-18 ENCOUNTER — TELEPHONE (OUTPATIENT)
Dept: ENDOCRINOLOGY | Age: 43
End: 2018-04-18

## 2018-04-18 DIAGNOSIS — E27.8 ADRENAL NODULE (HCC): Primary | ICD-10-CM

## 2018-04-19 LAB — CORTIS AM PEAK SERPL-MCNC: 2 UG/DL (ref 6.2–19.4)

## 2018-05-04 DIAGNOSIS — E27.9 DISORDER OF ADRENAL GLAND (HCC): Primary | ICD-10-CM

## 2018-05-04 RX ORDER — DEXAMETHASONE 1 MG/1
TABLET ORAL
Qty: 1 TAB | Refills: 0 | Status: SHIPPED | OUTPATIENT
Start: 2018-05-04 | End: 2019-10-15 | Stop reason: ALTCHOICE

## 2018-05-04 NOTE — TELEPHONE ENCOUNTER
----- Message from Mary Yost MD sent at 5/4/2018  9:29 AM EDT -----  Informed patient that her cortisol in the morning came slightly abnormal    1 she has to repeat the same test one more time  ( decadron 1 mg at 11 pm and coming in for lab draw in AM around 8)      2.   And also I would like her to collect salivary cortisol gets so she can do them at 11 PM in the nights  Specifically you have to let her know that she cannot do the salivary cortisol checks when she is doing the first test which involves taking the Decadron at 11 PM

## 2018-05-04 NOTE — TELEPHONE ENCOUNTER
Spoke with patient informed of results. Patient verbalized understanding. Patient will  salivary kit at lab visit on May 8, 2018 @ 0815.     Verbal order given for labs from Dr. Zoe Nolasco

## 2018-05-08 ENCOUNTER — DOCUMENTATION ONLY (OUTPATIENT)
Dept: ENDOCRINOLOGY | Age: 43
End: 2018-05-08

## 2018-05-08 NOTE — PROGRESS NOTES
Reviewed CT abdomen and pelvis done without contrast dated October 28, 2017 from [facility]    Patient was found to have a markedly distended gallbladder which is of course removed now    This there is a 3.5 cm nodule or enlargement of the left adrenal gland with a low-density nodule of 10-15 HU likely a adrenal adenoma

## 2018-05-09 LAB — CORTIS AM PEAK SERPL-MCNC: 2 UG/DL (ref 6.2–19.4)

## 2018-05-14 DIAGNOSIS — E11.65 TYPE 2 DIABETES MELLITUS WITH HYPERGLYCEMIA, WITHOUT LONG-TERM CURRENT USE OF INSULIN (HCC): ICD-10-CM

## 2018-05-14 RX ORDER — INSULIN DEGLUDEC 100 U/ML
INJECTION, SOLUTION SUBCUTANEOUS
Qty: 45 ML | Refills: 3 | Status: SHIPPED | OUTPATIENT
Start: 2018-05-14 | End: 2019-07-13 | Stop reason: SDUPTHER

## 2018-05-17 ENCOUNTER — TELEPHONE (OUTPATIENT)
Dept: ENDOCRINOLOGY | Age: 43
End: 2018-05-17

## 2018-05-17 NOTE — TELEPHONE ENCOUNTER
Please call patient in ref to lab results and numbers that look the same as the previous set. What is the next step?  Thank you [phone]

## 2018-05-21 ENCOUNTER — DOCUMENTATION ONLY (OUTPATIENT)
Dept: ENDOCRINOLOGY | Age: 43
End: 2018-05-21

## 2018-05-21 DIAGNOSIS — D49.7 ADRENAL TUMOR: Primary | ICD-10-CM

## 2018-05-21 NOTE — TELEPHONE ENCOUNTER
I spoke to her in person , ordered CT adrenal   She has to get 24 hr container for urine free cortisol       Ian Alamo MD

## 2018-05-21 NOTE — TELEPHONE ENCOUNTER
Called pt today to discuss results- [phone] and also another number     [phone] Is not in service     Other number left message

## 2018-05-21 NOTE — PROGRESS NOTES
Spoke to pt and asked her to get CT adrenal done   Also to come by to pick a 24 hr urine container for 24 hr urine free cortisol       Give her 24 hr urine instructions     24 hour urine collection instructions    On the day you plan to collect urine    1. Discard first urine sample soon after you get up    2. Start collecting urine samples in the container then on whole first day . Mare Garcia ... 3. until the first sample next day is collected into the jar.

## 2018-05-24 RX ORDER — INSULIN ASPART 100 [IU]/ML
INJECTION, SOLUTION INTRAVENOUS; SUBCUTANEOUS
Qty: 45 ML | Refills: 4 | Status: SHIPPED | OUTPATIENT
Start: 2018-05-24 | End: 2019-07-13 | Stop reason: SDUPTHER

## 2018-05-31 ENCOUNTER — TELEPHONE (OUTPATIENT)
Dept: ENDOCRINOLOGY | Age: 43
End: 2018-05-31

## 2018-05-31 DIAGNOSIS — E27.8 ADRENAL NODULE (HCC): Primary | ICD-10-CM

## 2018-06-05 ENCOUNTER — DOCUMENTATION ONLY (OUTPATIENT)
Dept: ENDOCRINOLOGY | Age: 43
End: 2018-06-05

## 2018-06-07 ENCOUNTER — HOSPITAL ENCOUNTER (OUTPATIENT)
Dept: CT IMAGING | Age: 43
Discharge: HOME | End: 2018-06-07
Attending: INTERNAL MEDICINE
Payer: COMMERCIAL

## 2018-06-07 DIAGNOSIS — D49.7 ADRENAL TUMOR: ICD-10-CM

## 2018-06-07 PROCEDURE — 74150 CT ABDOMEN W/O CONTRAST: CPT

## 2018-06-13 LAB
CORTIS F 24H UR-MRATE: 54 UG/24 HR (ref 0–50)
CORTIS F UR-MCNC: 25 UG/L
CREAT 24H UR-MRATE: 1699 MG/24 HR (ref 800–1800)
CREAT UR-MCNC: 79 MG/DL

## 2018-06-15 ENCOUNTER — OFFICE VISIT (OUTPATIENT)
Dept: ENDOCRINOLOGY | Age: 43
End: 2018-06-15

## 2018-06-15 VITALS
RESPIRATION RATE: 18 BRPM | HEIGHT: 72 IN | OXYGEN SATURATION: 99 % | HEART RATE: 101 BPM | DIASTOLIC BLOOD PRESSURE: 64 MMHG | BODY MASS INDEX: 38.63 KG/M2 | WEIGHT: 285.2 LBS | TEMPERATURE: 98 F | SYSTOLIC BLOOD PRESSURE: 113 MMHG

## 2018-06-15 DIAGNOSIS — E27.8 ADRENAL NODULE (HCC): Primary | ICD-10-CM

## 2018-06-15 DIAGNOSIS — Z79.4 UNCONTROLLED TYPE 2 DIABETES MELLITUS WITH HYPERGLYCEMIA, WITH LONG-TERM CURRENT USE OF INSULIN (HCC): ICD-10-CM

## 2018-06-15 DIAGNOSIS — E11.65 UNCONTROLLED TYPE 2 DIABETES MELLITUS WITH HYPERGLYCEMIA, WITH LONG-TERM CURRENT USE OF INSULIN (HCC): ICD-10-CM

## 2018-06-15 NOTE — PROGRESS NOTES
HISTORY OF PRESENT ILLNESS  Jonh Gómez is a 44  y.o. female. HPI  Patient here for f/u visit for Type 2 diabetes mellitus   And  for adrenal nodule   after last visit from April 2018     She had right second toe infection and stayed in hospital in jan 2018   She had I/v antibiotics       She got no meter   Sugars are high     She had allergic reaction  To vancomycin and had to get steroids for it   She received novolog     She ran out of insulins           Old history   Has not followed up for more than a year     She is s/p  bariatric surgery done , gastric sleeve , by Dr. Tamela Gonzalez on oct 17 2016   She lost 45 lbs post surgery       She had 15 lbs since last visit     She had  Multiple infections, the major one was cholycystectomy   Had multiple imaging test done , and that showed incidental nodule on adrenal glands     Every time she joins the gym, she fractures   She has charcots joints       Review of Systems   Constitutional: Negative. HENT: Negative. Eyes: Negative for pain and redness. Respiratory: Negative. Cardiovascular: Negative for chest pain, palpitations and leg swelling. Gastrointestinal: Negative. Negative for constipation. Genitourinary: Negative. Musculoskeletal: Negative for myalgias. Skin: Negative. Neurological: Negative. Endo/Heme/Allergies: Negative. Psychiatric/Behavioral: Negative for depression and memory loss. The patient does not have insomnia. Physical Exam   Constitutional: She is oriented to person, place, and time. She appears well-developed and well-nourished. HENT: Body mass index is 37.63 kg/(m^2). Head: Normocephalic. Eyes: Conjunctivae and EOM are normal. Pupils are equal, round, and reactive to light. Neck: Normal range of motion. Neck supple. No JVD present. No tracheal deviation present. No thyromegaly present. Cardiovascular: Normal rate, regular rhythm and normal heart sounds. No murmur heard.    Pulmonary/Chest: Breath sounds normal.   Abdominal: Soft. Bowel sounds are normal.   Musculoskeletal: Normal range of motion. Lymphadenopathy:   She has no cervical adenopathy. Neurological: She is alert and oriented to person, place, and time. She has normal reflexes. Skin: Skin is warm. Psychiatric: She has a normal mood and affect. Diabetic foot exam: April 2018    Left: Reflexes nd     Vibratory sensation diminished    Proprioception nd   Sharp/dull discrimination nd    Filament test reduced sensation with micro filament   Pulse DP: 2+ (normal)   Pulse PT: nd   Deformities: None, dorsiflexed toes, rough plantar skin   Right: Reflexes nd   Vibratory sensation diminished   Proprioception nd   Sharp/dull discrimination nd   Filament test reduced sensation with micro filament   Pulse DP: 2+ (normal)   Pulse PT: nd   Deformities: Yes - dorsiflexed toes, ulcer healed on second toe ( charcots joints )          She had A1c in [facility] 7.5 %    Oct 28 thru nov 3   2017          ASSESSMENT and PLAN    1.  Type 2 DM, un controlled : A1c is    9.3 %      From      Feb 2018    Compared to    7.5 %      From  Oct 2017      Compared to   7.2 %  Today   From  Oct 2016 compared to   8.5 %     From   March 2016  Compared to 8.4 %   From dec 2015   compared to    10.2 %     From oct 2015 comapred to  7.4 %  From June 2014 compared to  9.6 % from [facility] 2014 compared to  7.7 % march 2013  Compared to  9.9 % March 2012 compared to 8.7% in march 2010    She is s/p gastric sleeve surgery  -  Oct 2016   She has measurable c-pep , definitely type 2 diabetic    (? Normoglycemic DKA , unexpected on her hospitalization  At [address] while on [address] [facility] )  Post bariatric surgery , she had to be on insulin   Increased   Lantus  And started on meal time insulin     She did not bring her meter along to  the glycemic insight    Off  Bydureon,  but is a good option if weight loss is needed   Discussed with her the start on metformin    Patient is advised about checking blood sugars 4 times a day and maintaining log book. .   lab results and schedule of future lab studies reviewed with patient       2. Hypoglycemia : Educated on treating the hypoglycemia. Discussed insulin use     3. HTN : well controlled on  benicar- hctz,  Patient is educated about importance of compliance with anti-hypertensives especially ARB/ACEI     4. Dyslipidemia : lipids are to checked , as it is due after gastric sleeve     . Patient is educated about benefits and adverse effects of statins and explained how benefits outweigh risk. 5. use of aspirin to prevent MI and TIA's discussed     6. PCOS- off  spironolactone   She is off  ocp , as she could get more fertile, She is educated about increased fertility with weight loss        7. Obesity : made up her mind to  undergo bariatric surgery, sleeve versus bypass   Body mass index is 37.63 kg/(m^2). S/p gastric sleeve surgery    Suggested the use of freestyle chaz, patient refused      8. 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 Right adrenal is normal .    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 The question needs to be addressed is it the left side nodule, the one making cortisol . This needs clarification by adrenal vein sampling . Discussed the need for lap adrenalectomy .       > 50 % of time is spent on counseling   Patient voiced understanding her plan of care         F/u  in 2  months

## 2018-06-15 NOTE — MR AVS SNAPSHOT
[address] [facility] [address] [facility] [id number] [phone] Patient: Karlie Sanchez MRN: [id number] FNI:[id number] Visit Information Date & Time Provider Department Dept. Phone Encounter #  
 6/15/2018  2:15 PM Julianna Mishra MD [facility] [phone] [id number] Follow-up Instructions Return in about 6 weeks (around 7/27/2018). Upcoming Health Maintenance Date Due Pneumococcal 19-64 Medium Risk (1 of 1 - PPSV23) 6/13/1995 DTaP/Tdap/Td series (1 - Tdap) 6/13/1997 EYE EXAM RETINAL OR DILATED Q1 5/23/2018 Influenza Age 5 to Adult 8/1/2018 HEMOGLOBIN A1C Q6M 8/22/2018 MICROALBUMIN Q1 2/22/2019 LIPID PANEL Q1 2/22/2019 FOOT EXAM Q1 4/16/2019 PAP AKA CERVICAL CYTOLOGY 8/30/2020 Allergies as of 6/15/2018  Review Complete On: 6/15/2018 By: Julianna Mishra MD  
  
 Severity Noted Reaction Type Reactions Vancomycin  04/16/2018    Rash Current Immunizations  Never Reviewed No immunizations on file. Not reviewed this visit You Were Diagnosed With   
  
 Codes Comments Ad[facility] nod[facility]    -  Primary ICD-10-CM: E27.9 ICD-9-CM: 255.8 Uncontrolled type 2 diabetes mellitus with hyperglycemia, with long-term current use of insulin (HCC)     ICD-10-CM: E11.65, Z79.4 ICD-9-CM: 250.02, V58.67 Vitals BP Pulse Temp Resp Height(growth percentile) Weight(growth percentile) 113/64 (BP 1 Location: Right arm, BP Patient Position: Sitting) (!) 101 98 °F (36.7 °C) (Oral) 18 6' 1\" (1.854 m) 285 lb 3.2 oz (129.4 kg) LMP SpO2 BMI OB Status Smoking Status 06/02/2018 99% 37.63 kg/m2 Having regular periods Never Smoker Vitals History BMI and BSA Data Body Mass Index Body Surface Area  
 37.63 kg/m 2 2.58 m 2 Preferred Pharmacy Pharmacy Name Phone [id number], [address] [phone] Your Updated Medication List  
  
   
This list is accurate as of 6/15/18  2:31 PM.  Always use your most recent med list.  
  
  
  
  
 BD ULTRA-FINE SHORT PEN NEEDLE 31 gauge x 5/16\" Ndle Generic drug:  Insulin Needles (Disposable) USE AS DIRECTED 4 TIMES A DAY Insulin Needles (Disposable) 30 gauge x 1/3\" Check blood sugar four times a day E11.65 Blood-Glucose Meter monitoring kit Commonly known as:  Belington Cameron Use to check blood sugar 4 times daily. Dx code E11.65  
  
 dexamethasone 1 mg tablet Commonly known as:  DECADRON Take one tablet at 11pm before fasting labs. glucose blood VI test strips strip Commonly known as:  ONETOUCH ULTRA TEST Use to check blood sugar 4 times daily. Dx code E11.65  
  
 insulin aspart U-100 100 unit/mL Inpn Commonly known as:  Beverlyn Ege Inject 8 units before breakfast, lunch, and dinner. Plus sliding scale. Max daily dose 66 units  
  
 insulin degludec 100 unit/mL (3 mL) Inpn Commonly known as:  TRESIBA FLEXTOUCH U-100 Increase INJECT 50 UNITS SUBCUTANEOUS ROUTE AT BEDTIME. Lancets Misc Use to check blood sugar 4 times daily. Dx code E11.65  
  
 metFORMIN 500 mg tablet Commonly known as:  GLUCOPHAGE Take one pill twice a day with meals  
  
 olmesartan-hydroCHLOROthiazide 40-12.5 mg per tablet Commonly known as:  BENICAR HCT  
TAKE 1 TABLET BY MOUTH EVERY DAY  
  
 pantoprazole 40 mg tablet Commonly known as:  PROTONIX TK 1 T PO D  
  
 spironolactone 50 mg tablet Commonly known as:  ALDACTONE Take 1 Tab by mouth two (2) times a day. Follow-up Instructions Return in about 6 weeks (around 7/27/2018). Patient Instructions   
-------------------------------------------------------------------------------------------- Refills    -    please call your pharmacy and have them send us a refill request 
 
Results  -  allow up to a week for lab results to be processed and reviewed. Phone calls  -  Allow upto 24 hrs. for non-urgent calls to be retained Prior authorization - It may take up to 2 weeks to process, depending on your insurance Forms  -  FMLA, DMV, patient assistance, etc. will take up to 2 weeks to process Cancellations - please notify the office in advance if you cannot keep your appointment Samples  - will only be dispensed at visits as supply is limited If you are having a medical emergency call 911 
 
-------------------------------------------------------------------------------------------- 
NO OCPS 24 hour urine collection instructions  For free cortisol  And creatinine On the day you plan to collect urine 1. Discard first urine sample soon after you get up 2. Start collecting urine samples in the container then on whole first day . Ibeth Rodriguez ... 3. until the first sample next day is collected into the jar. 
 
 
--------------------------------------------------------------------------------------------------------------------------------------------------------------------- Salivary kits at 11 pm   Times two Stay on Metformin 500 mg one  pill  Twice a day  With  meals Check blood sugars right before each meal and at bed time Increase Tresiba   insulin 50  units at bed time Start on novolog  At  8 units with each meal  
Add additional novolog by sliding scale  as folllows with meals: 
 
150-200 mg 2 units 201-250 mg 4 units 251-300 mg 6 units 301-350 mg 8 units 351-400 mg 10 units 401-450 mg 12 units 451-500 mg 14 units Less than 70 mg NO INSULIN  
 
--------------------------------------------------------------------------------------------------------------------------------------------------- Introducing [facility] & HEALTH SERVICES! Dear Batsheva Petersen: Thank you for requesting a [employer] account. Our records indicate that you already have an active [employer] account.   You can access your account anytime at [email or web address] Did you know that you can access your hospital and ER discharge instructions at any time in [employer]? You can also review all of your test results from your hospital stay or ER visit. Additional Information If you have questions, please visit the Frequently Asked Questions section of the [employer] website at [email or web address] Remember, [employer] is NOT to be used for urgent needs. For medical emergencies, dial 911. Now available from your iPhone and Android! Please provide this summary of care documentation to your next provider. Your primary care clinician is listed as [employer]. If you have any questions after today's visit, please call [phone].

## 2018-06-15 NOTE — PATIENT INSTRUCTIONS
--------------------------------------------------------------------------------------------    Refills    -    please call your pharmacy and have them send us a refill request    Results  -  allow up to a week for lab results to be processed and reviewed. Phone calls  -  Allow upto 24 hrs. for non-urgent calls to be retained    Prior authorization - It may take up to 2 weeks to process, depending on your insurance    Forms  -  FMLA, DMV, patient assistance, etc. will take up to 2 weeks to process    Cancellations - please notify the office in advance if you cannot keep your appointment    Samples  - will only be dispensed at visits as supply is limited      If you are having a medical emergency call 911    --------------------------------------------------------------------------------------------  NO OCPS       24 hour urine collection instructions  For free cortisol  And creatinine     On the day you plan to collect urine    1. Discard first urine sample soon after you get up    2. Start collecting urine samples in the container then on whole first day . Jean-Paul Mauricio ...     3. until the first sample next day is collected into the jar.      ---------------------------------------------------------------------------------------------------------------------------------------------------------------------    Salivary kits at 11 pm   Times two             Stay on Metformin 500 mg one  pill  Twice a day  With  meals       Check blood sugars right before each meal and at bed time         Increase Tresiba   insulin 50  units at bed time      Start on novolog  At  8 units with each meal   Add additional novolog by sliding scale  as folllows with meals:    150-200 mg 2 units    201-250 mg 4 units    251-300 mg 6 units    301-350 mg 8 units    351-400 mg 10 units    401-450 mg 12 units    451-500 mg 14 units    Less than 70 mg NO INSULIN ---------------------------------------------------------------------------------------------------------------------------------------------------

## 2018-06-15 NOTE — PROGRESS NOTES
Wt Readings from Last 3 Encounters:   06/15/18 285 lb 3.2 oz (129.4 kg)   04/16/18 281 lb 8 oz (127.7 kg)   12/05/17 287 lb 3.2 oz (130.3 kg)     Temp Readings from Last 3 Encounters:   06/15/18 98 °F (36.7 °C) (Oral)   04/16/18 97.5 °F (36.4 °C) (Oral)   12/05/17 98.7 °F (37.1 °C) (Oral)     BP Readings from Last 3 Encounters:   06/15/18 113/64   04/16/18 148/80   12/05/17 140/88     Pulse Readings from Last 3 Encounters:   06/15/18 (!) 101   04/16/18 82   12/05/17 78     Lab Results   Component Value Date/Time    Hemoglobin A1c 9.3 (H) 02/22/2018 08:11 AM    Hemoglobin A1c (POC) 7.1 12/05/2017 03:54 PM    Hemoglobin A1c, External 10.2 10/04/2015     Pulse elevated 101  Patient does not have logbook or meter. States she is not being seen for diabetes today.

## 2018-06-18 ENCOUNTER — TELEPHONE (OUTPATIENT)
Dept: ENDOCRINOLOGY | Age: 43
End: 2018-06-18

## 2018-06-18 DIAGNOSIS — E27.8 ADRENAL NODULE (HCC): Primary | ICD-10-CM

## 2018-06-18 NOTE — TELEPHONE ENCOUNTER
Patient says during last visit she and Dr. Daisy Banda discussed prescribing Welbutrin. Patient says she would like to move forward with prescribing Welbutrin and send to [facility] [address].

## 2018-06-20 RX ORDER — BUPROPION HYDROCHLORIDE 150 MG/1
150 TABLET, EXTENDED RELEASE ORAL DAILY
Qty: 30 TAB | Refills: 6 | Status: SHIPPED | OUTPATIENT
Start: 2018-06-20 | End: 2018-08-17 | Stop reason: SDUPTHER

## 2018-06-20 NOTE — PROGRESS NOTES
Sent the prescription for wellbutrin 150     She also needs fasting labs doen for cortisol am, acth and dheas   AGAIN    Tell her that I have to be running bunch of tests until I really know that the side of nodules on adrenal gland are truly the CAUSE for problem     Sometimes, wrong side gets removed -- that is why   I am still wearing my thinking hat

## 2018-06-25 RX ORDER — OLMESARTAN MEDOXOMIL AND HYDROCHLOROTHIAZIDE 40/12.5 40; 12.5 MG/1; MG/1
TABLET ORAL
Qty: 90 TAB | Refills: 1 | Status: ON HOLD | OUTPATIENT
Start: 2018-06-25 | End: 2020-02-04

## 2018-08-17 RX ORDER — BUPROPION HYDROCHLORIDE 150 MG/1
150 TABLET, EXTENDED RELEASE ORAL DAILY
Qty: 90 TAB | Refills: 4 | Status: SHIPPED | OUTPATIENT
Start: 2018-08-17 | End: 2019-10-29 | Stop reason: SDUPTHER

## 2018-12-21 ENCOUNTER — TELEPHONE (OUTPATIENT)
Dept: ENDOCRINOLOGY | Age: 43
End: 2018-12-21

## 2018-12-21 RX ORDER — PEN NEEDLE, DIABETIC 30 GX3/16"
NEEDLE, DISPOSABLE MISCELLANEOUS
Qty: 200 PACKAGE | Refills: 6 | Status: SHIPPED | OUTPATIENT
Start: 2018-12-21 | End: 2019-10-15 | Stop reason: SDUPTHER

## 2018-12-21 NOTE — TELEPHONE ENCOUNTER
Refills:    BD insulin pen needle uf short (was using  uncles for a long time) last fill     One touch test strips

## 2019-04-26 ENCOUNTER — TELEPHONE (OUTPATIENT)
Dept: ENDOCRINOLOGY | Age: 44
End: 2019-04-26

## 2019-04-26 DIAGNOSIS — E11.65 UNCONTROLLED TYPE 2 DIABETES MELLITUS WITH HYPERGLYCEMIA, WITH LONG-TERM CURRENT USE OF INSULIN (HCC): Primary | ICD-10-CM

## 2019-04-26 DIAGNOSIS — D49.7 ADRENAL TUMOR: ICD-10-CM

## 2019-04-26 DIAGNOSIS — Z79.4 UNCONTROLLED TYPE 2 DIABETES MELLITUS WITH HYPERGLYCEMIA, WITH LONG-TERM CURRENT USE OF INSULIN (HCC): Primary | ICD-10-CM

## 2019-04-29 DIAGNOSIS — D49.7 ADRENAL TUMOR: Primary | ICD-10-CM

## 2019-05-09 RX ORDER — BLOOD-GLUCOSE METER
EACH MISCELLANEOUS
Qty: 1 EACH | Refills: 0 | Status: SHIPPED | OUTPATIENT
Start: 2019-05-09 | End: 2019-12-13 | Stop reason: SDUPTHER

## 2019-05-09 RX ORDER — LANCETS
EACH MISCELLANEOUS
Qty: 200 EACH | Refills: 6 | Status: SHIPPED | OUTPATIENT
Start: 2019-05-09 | End: 2020-02-04

## 2019-06-24 DIAGNOSIS — E11.65 TYPE 2 DIABETES MELLITUS WITH HYPERGLYCEMIA, WITHOUT LONG-TERM CURRENT USE OF INSULIN (HCC): ICD-10-CM

## 2019-06-24 RX ORDER — INSULIN DEGLUDEC 100 U/ML
INJECTION, SOLUTION SUBCUTANEOUS
Refills: 3 | OUTPATIENT
Start: 2019-06-24

## 2019-06-24 RX ORDER — INSULIN ASPART 100 [IU]/ML
INJECTION, SOLUTION INTRAVENOUS; SUBCUTANEOUS
Refills: 4 | OUTPATIENT
Start: 2019-06-24

## 2019-07-12 NOTE — TELEPHONE ENCOUNTER
Patient called stating her appointment is set for Oct. She wants to know if she can have refills until then stating medication was refussed for lack of apt.

## 2019-07-13 DIAGNOSIS — E11.65 TYPE 2 DIABETES MELLITUS WITH HYPERGLYCEMIA, WITHOUT LONG-TERM CURRENT USE OF INSULIN (HCC): ICD-10-CM

## 2019-07-13 RX ORDER — INSULIN ASPART 100 [IU]/ML
INJECTION, SOLUTION INTRAVENOUS; SUBCUTANEOUS
Qty: 45 ML | Refills: 4 | Status: SHIPPED | OUTPATIENT
Start: 2019-07-13 | End: 2019-10-15 | Stop reason: SDUPTHER

## 2019-07-13 RX ORDER — INSULIN DEGLUDEC 100 U/ML
INJECTION, SOLUTION SUBCUTANEOUS
Qty: 45 ML | Refills: 1 | Status: SHIPPED | OUTPATIENT
Start: 2019-07-13 | End: 2019-10-15 | Stop reason: SDUPTHER

## 2019-07-16 NOTE — TELEPHONE ENCOUNTER
Patient called last week asking about insulin. Can be called in. Please let patient know if she has to wait until her appt or you called it in.  Can be reached at [phone] until 4 today

## 2019-10-08 DIAGNOSIS — E11.65 UNCONTROLLED TYPE 2 DIABETES MELLITUS WITH HYPERGLYCEMIA, WITH LONG-TERM CURRENT USE OF INSULIN (HCC): ICD-10-CM

## 2019-10-08 DIAGNOSIS — Z79.4 UNCONTROLLED TYPE 2 DIABETES MELLITUS WITH HYPERGLYCEMIA, WITH LONG-TERM CURRENT USE OF INSULIN (HCC): ICD-10-CM

## 2019-10-08 DIAGNOSIS — D49.7 ADRENAL TUMOR: ICD-10-CM

## 2019-10-09 LAB
ACTH PLAS-MCNC: 16.5 PG/ML (ref 7.2–63.3)
ALBUMIN SERPL-MCNC: 3.8 G/DL (ref 3.5–5.5)
ALBUMIN/CREAT UR: 10.6 MG/G CREAT (ref 0–30)
ALBUMIN/GLOB SERPL: 1.6 {RATIO} (ref 1.2–2.2)
ALP SERPL-CCNC: 54 IU/L (ref 39–117)
ALT SERPL-CCNC: 15 IU/L (ref 0–32)
AST SERPL-CCNC: 16 IU/L (ref 0–40)
BILIRUB SERPL-MCNC: 0.6 MG/DL (ref 0–1.2)
BUN SERPL-MCNC: 8 MG/DL (ref 6–24)
BUN/CREAT SERPL: 11 (ref 9–23)
CALCIUM SERPL-MCNC: 9.3 MG/DL (ref 8.7–10.2)
CHLORIDE SERPL-SCNC: 99 MMOL/L (ref 96–106)
CHOLEST SERPL-MCNC: 210 MG/DL (ref 100–199)
CO2 SERPL-SCNC: 25 MMOL/L (ref 20–29)
CORTIS SERPL-MCNC: 12.6 UG/DL
CREAT SERPL-MCNC: 0.7 MG/DL (ref 0.57–1)
CREAT UR-MCNC: 227.3 MG/DL
EST. AVERAGE GLUCOSE BLD GHB EST-MCNC: 237 MG/DL
GLOBULIN SER CALC-MCNC: 2.4 G/DL (ref 1.5–4.5)
GLUCOSE SERPL-MCNC: 253 MG/DL (ref 65–99)
HBA1C MFR BLD: 9.9 % (ref 4.8–5.6)
HDLC SERPL-MCNC: 34 MG/DL
INTERPRETATION, 910389: NORMAL
LDLC SERPL CALC-MCNC: 104 MG/DL (ref 0–99)
Lab: NORMAL
MICROALBUMIN UR-MCNC: 24 UG/ML
POTASSIUM SERPL-SCNC: 4.5 MMOL/L (ref 3.5–5.2)
PROT SERPL-MCNC: 6.2 G/DL (ref 6–8.5)
SODIUM SERPL-SCNC: 139 MMOL/L (ref 134–144)
TRIGL SERPL-MCNC: 358 MG/DL (ref 0–149)
VLDLC SERPL CALC-MCNC: 72 MG/DL (ref 5–40)

## 2019-10-15 ENCOUNTER — OFFICE VISIT (OUTPATIENT)
Dept: ENDOCRINOLOGY | Age: 44
End: 2019-10-15

## 2019-10-15 VITALS
HEIGHT: 72 IN | OXYGEN SATURATION: 99 % | SYSTOLIC BLOOD PRESSURE: 121 MMHG | DIASTOLIC BLOOD PRESSURE: 80 MMHG | TEMPERATURE: 97.5 F | RESPIRATION RATE: 18 BRPM | HEART RATE: 91 BPM | WEIGHT: 293 LBS | BODY MASS INDEX: 39.68 KG/M2

## 2019-10-15 DIAGNOSIS — E11.65 TYPE 2 DIABETES MELLITUS WITH HYPERGLYCEMIA, WITHOUT LONG-TERM CURRENT USE OF INSULIN (HCC): ICD-10-CM

## 2019-10-15 DIAGNOSIS — E11.65 UNCONTROLLED TYPE 2 DIABETES MELLITUS WITH HYPERGLYCEMIA, WITH LONG-TERM CURRENT USE OF INSULIN (HCC): Primary | ICD-10-CM

## 2019-10-15 DIAGNOSIS — Z79.4 UNCONTROLLED TYPE 2 DIABETES MELLITUS WITH HYPERGLYCEMIA, WITH LONG-TERM CURRENT USE OF INSULIN (HCC): Primary | ICD-10-CM

## 2019-10-15 DIAGNOSIS — I10 ESSENTIAL HYPERTENSION: ICD-10-CM

## 2019-10-15 DIAGNOSIS — E27.9 DISORDER OF ADRENAL GLAND (HCC): ICD-10-CM

## 2019-10-15 DIAGNOSIS — D49.7 ADRENAL TUMOR: ICD-10-CM

## 2019-10-15 DIAGNOSIS — Z98.84 S/P BARIATRIC SURGERY: ICD-10-CM

## 2019-10-15 DIAGNOSIS — E78.2 MIXED HYPERLIPIDEMIA: ICD-10-CM

## 2019-10-15 RX ORDER — DEXAMETHASONE 0.5 MG/1
TABLET ORAL
Qty: 2 TAB | Refills: 0 | Status: SHIPPED | OUTPATIENT
Start: 2019-10-15 | End: 2019-12-18 | Stop reason: ALTCHOICE

## 2019-10-15 RX ORDER — PEN NEEDLE, DIABETIC 30 GX3/16"
NEEDLE, DISPOSABLE MISCELLANEOUS
Qty: 200 PACKAGE | Refills: 6 | Status: SHIPPED | OUTPATIENT
Start: 2019-10-15 | End: 2020-02-04

## 2019-10-15 RX ORDER — METFORMIN HYDROCHLORIDE 500 MG/1
TABLET ORAL
Qty: 60 TAB | Refills: 6 | Status: ON HOLD | OUTPATIENT
Start: 2019-10-15 | End: 2020-02-04

## 2019-10-15 RX ORDER — BLOOD-GLUCOSE METER
EACH MISCELLANEOUS
Qty: 1 EACH | Refills: 0 | Status: SHIPPED | OUTPATIENT
Start: 2019-10-15 | End: 2020-02-04

## 2019-10-15 RX ORDER — INSULIN DEGLUDEC 100 U/ML
INJECTION, SOLUTION SUBCUTANEOUS
Qty: 45 ML | Refills: 4 | Status: SHIPPED | OUTPATIENT
Start: 2019-10-15 | End: 2020-03-13 | Stop reason: SDUPTHER

## 2019-10-15 RX ORDER — LANCETS 33 GAUGE
EACH MISCELLANEOUS
Qty: 200 LANCET | Refills: 6 | Status: SHIPPED | OUTPATIENT
Start: 2019-10-15 | End: 2020-02-04

## 2019-10-15 RX ORDER — INSULIN ASPART 100 [IU]/ML
INJECTION, SOLUTION INTRAVENOUS; SUBCUTANEOUS
Qty: 45 ML | Refills: 4 | Status: ON HOLD | OUTPATIENT
Start: 2019-10-15 | End: 2020-02-04

## 2019-10-15 NOTE — LETTER
10/20/19 Patient: Jacqui Vera YOB: 1975 Date of Visit: 10/15/2019 Zahra Javier MD 
[facility] [address] [facility] [address] [id number] VIA Facsimile: [phone] Dear Zahra Javier MD, Thank you for referring Ms. Jozef Dexter to [facility] for evaluation. My notes for this consultation are attached. If you have questions, please do not hesitate to call me. I look forward to following your patient along with you. Sincerely, Asia Power MD

## 2019-10-15 NOTE — PATIENT INSTRUCTIONS
--------------------------------------------------------------------------------------------    Refills    -    please call your pharmacy and have them send us a refill request    Results  -  allow up to a week for lab results to be processed and reviewed. Phone calls  -  Allow upto 24 hrs. for non-urgent calls to be retained    Prior authorization - It may take up to 2 weeks to process, depending on your insurance    Forms  -  FMLA, DMV, patient assistance, etc. will take up to 2 weeks to process    Cancellations - please notify the office in advance if you cannot keep your appointment    Samples  - will only be dispensed at visits as supply is limited      If you are having a medical emergency call 911    --------------------------------------------------------------------------------------------  NO OCPS       24 hour urine collection instructions  For free cortisol  And creatinine     On the day you plan to collect urine    1. Discard first urine sample soon after you get up    2. Start collecting urine samples in the container then on whole first day . Hali Ask ...     3. until the first sample next day is collected into the jar.      ---------------------------------------------------------------------------------------------------------------------------------------------------------------------    Salivary kits at 11 pm   Times two     -------------------------------------------------------------------------------------------------------------------------------------------------------------------    1 mg decadron at 11 pm and you come for fasting lab  Cortisol, next day       -------------------------------------------------------------------------------------------------------------------------------------------------------------------------------------------------    START ON OZEMPIC  0.5 MG A WEEK AND INCREASE TO 1 MG A WEEK  AFTER I MONTH         Stay on Metformin 500 mg one  pill  Twice a day  With  meals Check blood sugars right before each meal and at bed time         Increase Tresiba   insulin 50  units at bed time      Start on novolog  At  8 units with each meal   Add additional novolog by sliding scale  as folllows with meals:    150-200 mg 2 units    201-250 mg 4 units    251-300 mg 6 units    301-350 mg 8 units    351-400 mg 10 units    401-450 mg 12 units    451-500 mg 14 units    Less than 70 mg NO INSULIN     ---------------------------------------------------------------------------------------------------------------------------------------------------

## 2019-10-15 NOTE — PROGRESS NOTES
HISTORY OF PRESENT ILLNESS  Karly Del Rio is a 38  y.o. female. HPI  Patient here for f/u visit for Type 2 diabetes mellitus   And  for adrenal nodule   after last visit from June 2018     A long GAP    Pt is emotional   She had lost her mother  And is quite depressed   Gained 14 lbs   She wants to do better         Old history :    She had right second toe infection and stayed in hospital in jan 2018   She had I/v antibiotics     She got no meter   Sugars are high   She had allergic reaction  To vancomycin and had to get steroids for it   She received novolog     She ran out of insulins           Old history   Has not followed up for more than a year     She is s/p  bariatric surgery done , gastric sleeve , by Dr. Zacarias Lipoma on oct 17 2016   She lost 45 lbs post surgery       She had 15 lbs since last visit     She had  Multiple infections, the major one was cholycystectomy   Had multiple imaging test done , and that showed incidental nodule on adrenal glands     Every time she joins the gym, she fractures   She has charcots joints       Review of Systems   Constitutional: Negative. HENT: Negative. Eyes: Negative for pain and redness. Respiratory: Negative. Cardiovascular: Negative for chest pain, palpitations and leg swelling. Gastrointestinal: Negative. Negative for constipation. Genitourinary: Negative. Musculoskeletal: Negative for myalgias. Skin: Negative. Neurological: Negative. Endo/Heme/Allergies: Negative. Psychiatric/Behavioral: Negative for depression and memory loss. The patient does not have insomnia. Physical Exam   Constitutional: She is oriented to person, place, and time. She appears well-developed and well-nourished. HENT: Body mass index is 40.44 kg/m². Head: Normocephalic. Eyes: Conjunctivae and EOM are normal. Pupils are equal, round, and reactive to light. Neck: Normal range of motion. Neck supple. No JVD present. No tracheal deviation present.  No thyromegaly present. Cardiovascular: Normal rate, regular rhythm and normal heart sounds. No murmur heard. Pulmonary/Chest: Breath sounds normal.   Abdominal: Soft. Bowel sounds are normal.   Musculoskeletal: Normal range of motion. Lymphadenopathy:   She has no cervical adenopathy. Neurological: She is alert and oriented to person, place, and time. She has normal reflexes. Skin: Skin is warm. Psychiatric: She has a normal mood and affect. Diabetic foot exam: April 2018    Left: Reflexes nd     Vibratory sensation diminished    Proprioception nd   Sharp/dull discrimination nd    Filament test reduced sensation with micro filament   Pulse DP: 2+ (normal)   Pulse PT: nd   Deformities: None, dorsiflexed toes, rough plantar skin   Right: Reflexes nd   Vibratory sensation diminished   Proprioception nd   Sharp/dull discrimination nd   Filament test reduced sensation with micro filament   Pulse DP: 2+ (normal)   Pulse PT: nd   Deformities: Yes - dorsiflexed toes, ulcer healed on second toe ( charcots joints )          She had A1c in [facility] 7.5 %    Oct 28 thru nov 3   2017    Lab Results   Component Value Date/Time    Hemoglobin A1c 9.9 (H) 10/08/2019 08:51 AM    Hemoglobin A1c 9.3 (H) 02/22/2018 08:11 AM    Hemoglobin A1c 8.5 (H) 03/11/2016 01:23 PM    Hemoglobin A1c, External 10.2 10/04/2015    Glucose 253 (H) 10/08/2019 08:51 AM    Glucose POC 95 12/05/2017 03:51 PM    Microalb/Creat ratio (ug/mg creat.) 10.6 10/08/2019 08:51 AM    LDL, calculated 104 (H) 10/08/2019 08:51 AM    Creatinine 0.70 10/08/2019 08:51 AM      Lab Results   Component Value Date/Time    Cholesterol, total 210 (H) 10/08/2019 08:51 AM    HDL Cholesterol 34 (L) 10/08/2019 08:51 AM    LDL, calculated 104 (H) 10/08/2019 08:51 AM    Triglyceride 358 (H) 10/08/2019 08:51 AM     Lab Results   Component Value Date/Time    ALT (SGPT) 15 10/08/2019 08:51 AM    AST (SGOT) 16 10/08/2019 08:51 AM    Alk.  phosphatase 54 10/08/2019 08:51 AM    Bilirubin, total 0.6 10/08/2019 08:51 AM    Albumin 3.8 10/08/2019 08:51 AM    Protein, total 6.2 10/08/2019 08:51 AM    PLATELET 121 66/63/5936 05:20 PM     Lab Results   Component Value Date/Time    GFR est non- 10/08/2019 08:51 AM    GFR est  10/08/2019 08:51 AM    Creatinine 0.70 10/08/2019 08:51 AM    BUN 8 10/08/2019 08:51 AM    Sodium 139 10/08/2019 08:51 AM    Potassium 4.5 10/08/2019 08:51 AM    Chloride 99 10/08/2019 08:51 AM    CO2 25 10/08/2019 08:51 AM    Magnesium 1.8 10/15/2015 09:59 AM    Phosphorus 5.8 (H) 10/15/2015 09:59 AM             ASSESSMENT and PLAN    1. Type 2 DM, un controlled : A1c is  9.9 %   From oct 2019   compared to      9.3 %      From      Feb 2018    Compared to    7.5 %      From  Oct 2017      Compared to   7.2 %  Today   From  Oct 2016 compared to   8.5 %     From   March 2016  Compared to 8.4 %   From dec 2015   compared to    10.2 %     From oct 2015 comapred to  7.4 %  From June 2014 compared to  9.6 % from MArch 2014 compared to  7.7 % march 2013  Compared to  9.9 % March 2012 compared to 8.7% in march 2010    She is s/p gastric sleeve surgery  -  Oct 2016   She has measurable c-pep , definitely type 2 diabetic    (? Normoglycemic DKA , unexpected on her hospitalization  At [facility] while [facility] )  Post bariatric surgery , she had to be on insulin     Stay on Lantus   meal time insulin   She did not bring her meter to  the glycemic insight    Off  Bydureon  Discussed with her the start of metformin     WILL DO [address]     Patient is advised about checking blood sugars 4 times a day and maintaining log book. .   lab results and schedule of future lab studies reviewed with patient       2. Hypoglycemia : Educated on treating the hypoglycemia. Discussed insulin use     3. HTN : well controlled on  benicar- hctz,  Patient is educated about importance of compliance with anti-hypertensives especially ARB/ACEI     4.  Dyslipidemia : lipids are IN CONTROL  , as it is due after gastric sleeve     . Patient is educated about benefits and adverse effects of statins and explained how benefits outweigh risk. 5. use of aspirin to prevent MI and TIA's discussed     6. PCOS- off  spironolactone   She is off  ocp , as she could get more fertile, She is educated about increased fertility with weight loss        7. Obesity : made up her mind to  undergo bariatric surgery, sleeve versus bypass   Body mass index is 40.44 kg/m². S/p gastric sleeve surgery  Suggested the use of freestyle chaz, patient refused      8. 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 Right adrenal is normal .    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         CHARCOTS JOINT ARHROPATHY       > 50 % of time is spent on counseling ON COMPLEX DECISION MAKING TO DECIDE ON MEDICATION VERSUS INVASIVE TESTING   Patient voiced understanding her plan of care         F/u  in 2  months

## 2019-10-15 NOTE — PROGRESS NOTES
Room 2    Identified pt with two pt identifiers(name and ). Reviewed record in preparation for visit and have obtained necessary documentation. All patient medications has been reviewed. Chief Complaint   Patient presents with    Diabetes    Labs       Health Maintenance Due   Topic    Pneumococcal 0-64 years (1 of 1 - PPSV23)    DTaP/Tdap/Td series (1 - Tdap)    EYE EXAM RETINAL OR DILATED     FOOT EXAM Q1     Influenza Age 5 to Adult        Vitals:    10/15/19 1301   BP: 121/80   Pulse: 91   Resp: 18   Temp: 97.5 °F (36.4 °C)   TempSrc: Oral   SpO2: 99%   Weight: 306 lb 8 oz (139 kg)   Height: 6' 1\" (1.854 m)   PainSc:   0 - No pain       Wt Readings from Last 3 Encounters:   10/15/19 306 lb 8 oz (139 kg)   06/15/18 285 lb 3.2 oz (129.4 kg)   18 281 lb 8 oz (127.7 kg)     Temp Readings from Last 3 Encounters:   10/15/19 97.5 °F (36.4 °C) (Oral)   06/15/18 98 °F (36.7 °C) (Oral)   18 97.5 °F (36.4 °C) (Oral)     BP Readings from Last 3 Encounters:   10/15/19 121/80   06/15/18 113/64   18 148/80     Pulse Readings from Last 3 Encounters:   10/15/19 91   06/15/18 (!) 101   18 82       Lab Results   Component Value Date/Time    Hemoglobin A1c 9.9 (H) 10/08/2019 08:51 AM    Hemoglobin A1c (POC) 7.1 2017 03:54 PM    Hemoglobin A1c, External 10.2 10/04/2015       Coordination of Care Questionnaire:   1) Have you been to an emergency room, urgent care, or hospitalized since your last visit?   no       2. Have seen or consulted any other health care provider since your last visit? NO    3) Do you have an Advanced Directive/ Living Will in place? NO  If yes, do we have a copy on file NO  If no, would you like information NO    Patient is accompanied by self I have received verbal consent from Niraj Welch to discuss any/all medical information while they are present in the room.

## 2019-10-28 ENCOUNTER — HOSPITAL ENCOUNTER (OUTPATIENT)
Dept: CT IMAGING | Age: 44
Discharge: HOME | End: 2019-10-28
Attending: INTERNAL MEDICINE
Payer: COMMERCIAL

## 2019-10-28 DIAGNOSIS — D49.7 ADRENAL TUMOR: ICD-10-CM

## 2019-10-28 PROCEDURE — 74150 CT ABDOMEN W/O CONTRAST: CPT

## 2019-10-29 RX ORDER — BUPROPION HYDROCHLORIDE 150 MG/1
TABLET, EXTENDED RELEASE ORAL
Qty: 90 TAB | Refills: 4 | Status: SHIPPED | OUTPATIENT
Start: 2019-10-29 | End: 2020-12-22 | Stop reason: SDUPTHER

## 2019-11-05 RX ORDER — INSULIN PUMP SYRINGE, 3 ML
EACH MISCELLANEOUS
Qty: 1 KIT | Refills: 0 | Status: SHIPPED | OUTPATIENT
Start: 2019-11-05 | End: 2020-02-04

## 2019-11-05 RX ORDER — LANCETS 28 GAUGE
EACH MISCELLANEOUS
Qty: 200 LANCET | Refills: 11 | Status: SHIPPED | OUTPATIENT
Start: 2019-11-05 | End: 2020-02-04

## 2019-11-15 ENCOUNTER — TELEPHONE (OUTPATIENT)
Dept: ENDOCRINOLOGY | Age: 44
End: 2019-11-15

## 2019-11-15 NOTE — TELEPHONE ENCOUNTER
Patient states she will take the pill at 11pm Sunday. . Patient is scheduled for fasting labs Monday morning at 8am. Are we getting a cortisol lab only or are we collecting all labs that are currently ordered?

## 2019-11-18 ENCOUNTER — HOSPITAL ENCOUNTER (OUTPATIENT)
Dept: LAB | Age: 44
Discharge: HOME | End: 2019-11-18

## 2019-11-18 DIAGNOSIS — D49.7 ADRENAL TUMOR: ICD-10-CM

## 2019-11-18 DIAGNOSIS — D49.7 ADRENAL TUMOR: Primary | ICD-10-CM

## 2019-11-18 DIAGNOSIS — E27.9 DISORDER OF ADRENAL GLAND (HCC): ICD-10-CM

## 2019-11-18 LAB
COMMENT, HOLDF: NORMAL
SAMPLES BEING HELD,HOLD: NORMAL

## 2019-11-19 LAB — CORTIS AM PEAK SERPL-MCNC: 0.9 UG/DL (ref 4.3–22.45)

## 2019-11-20 ENCOUNTER — DOCUMENTATION ONLY (OUTPATIENT)
Dept: ENDOCRINOLOGY | Age: 44
End: 2019-11-20

## 2019-11-20 ENCOUNTER — TELEPHONE (OUTPATIENT)
Dept: ENDOCRINOLOGY | Age: 44
End: 2019-11-20

## 2019-11-20 DIAGNOSIS — E78.1 HYPERTRIGLYCERIDEMIA: ICD-10-CM

## 2019-11-20 DIAGNOSIS — Z79.4 ENCOUNTER FOR LONG-TERM (CURRENT) USE OF INSULIN (HCC): ICD-10-CM

## 2019-11-20 DIAGNOSIS — E11.65 HYPERGLYCEMIA DUE TO TYPE 2 DIABETES MELLITUS (HCC): ICD-10-CM

## 2019-11-20 DIAGNOSIS — E27.9 DISORDER OF ADRENAL GLAND (HCC): Primary | ICD-10-CM

## 2019-11-20 DIAGNOSIS — I10 ESSENTIAL HYPERTENSION: ICD-10-CM

## 2019-11-20 DIAGNOSIS — E66.01 MORBID OBESITY DUE TO EXCESS CALORIES (HCC): ICD-10-CM

## 2019-11-20 RX ORDER — SPIRONOLACTONE 50 MG/1
50 TABLET, FILM COATED ORAL 2 TIMES DAILY
Qty: 90 TAB | Refills: 2 | Status: ON HOLD | OUTPATIENT
Start: 2019-11-20 | End: 2020-02-04

## 2019-11-20 RX ORDER — DROSPIRENONE AND ETHINYL ESTRADIOL 0.02-3(28)
KIT ORAL
Qty: 1 PACKAGE | Refills: 5 | Status: SHIPPED | OUTPATIENT
Start: 2019-11-20 | End: 2020-05-04 | Stop reason: SDUPTHER

## 2019-11-20 NOTE — PROGRESS NOTES
I discussed the effects  Of KORLYM  With pt     - not to get pregnant, she is willing to start on ocp- being sent   - to start on spironolactone with Korlym     - might drop her potassium levels and might drop her sugars low       Cassidy Orlando MD

## 2019-11-20 NOTE — PROGRESS NOTES
I spoke to her and she will come for pregnancy test and she will have to sign Hippa sheet for new pill  Chanelle Archibald MD

## 2019-11-20 NOTE — TELEPHONE ENCOUNTER
----- Message from Aneta Ferreira sent at 11/20/2019  1:50 PM EST -----  Regarding: /telephone  Patient return call    Caller's first and last name and relationship (if not the patient):      Best contact number(s): [phone] until 4 PM. [phone]. Whose call is being returned: The nurse.       Details to clarify the request:      Dori Barnes

## 2019-11-20 NOTE — PROGRESS NOTES
I left a message for her to call me back     She has to sign HIPAA form and get preg test done for Mickie Beard MD

## 2019-11-25 ENCOUNTER — HOSPITAL ENCOUNTER (OUTPATIENT)
Dept: LAB | Age: 44
Discharge: HOME | End: 2019-11-25

## 2019-11-25 ENCOUNTER — APPOINTMENT (OUTPATIENT)
Dept: ENDOCRINOLOGY | Age: 44
End: 2019-11-25

## 2019-11-25 DIAGNOSIS — E27.9 DISORDER OF ADRENAL GLAND (HCC): ICD-10-CM

## 2019-11-25 LAB — HCG SERPL QL: NEGATIVE

## 2019-12-02 ENCOUNTER — LAB ONLY (OUTPATIENT)
Dept: ENDOCRINOLOGY | Age: 44
End: 2019-12-02

## 2019-12-02 ENCOUNTER — HOSPITAL ENCOUNTER (OUTPATIENT)
Dept: LAB | Age: 44
Discharge: HOME | End: 2019-12-02

## 2019-12-02 DIAGNOSIS — D49.7 ADRENAL TUMOR: Primary | ICD-10-CM

## 2019-12-02 DIAGNOSIS — D49.7 ADRENAL TUMOR: ICD-10-CM

## 2019-12-02 DIAGNOSIS — Z98.84 S/P BARIATRIC SURGERY: ICD-10-CM

## 2019-12-02 DIAGNOSIS — E27.9 DISORDER OF ADRENAL GLAND (HCC): ICD-10-CM

## 2019-12-03 LAB
COMMENT, HOLDF: NORMAL
CORTIS AM PEAK SERPL-MCNC: 21.6 UG/DL (ref 4.3–22.45)
SAMPLES BEING HELD,HOLD: NORMAL

## 2019-12-04 ENCOUNTER — HOSPITAL ENCOUNTER (OUTPATIENT)
Dept: LAB | Age: 44
Discharge: HOME | End: 2019-12-04

## 2019-12-04 LAB
ACTH PLAS-MCNC: 13.8 PG/ML (ref 7.2–63.3)
METANEPH FREE SERPL-MCNC: 14 PG/ML (ref 0–62)
NORMETANEPHRINE SERPL-MCNC: 39 PG/ML (ref 0–145)

## 2019-12-05 LAB — RENIN PLAS-CCNC: 1.57 NG/ML/HR (ref 0.17–5.38)

## 2019-12-09 LAB
ALDOST SERPL-MCNC: 9.2 NG/DL (ref 0–30)
RENIN PLAS-CCNC: 1.29 NG/ML/HR (ref 0.17–5.38)
SPECIMEN SOURCE: NORMAL

## 2019-12-10 ENCOUNTER — TELEPHONE (OUTPATIENT)
Dept: ENDOCRINOLOGY | Age: 44
End: 2019-12-10

## 2019-12-10 NOTE — TELEPHONE ENCOUNTER
Annie with [facility] called to speak to you in ref to this patient.  [phone]

## 2019-12-11 ENCOUNTER — OFFICE VISIT (OUTPATIENT)
Dept: ENDOCRINOLOGY | Age: 44
End: 2019-12-11

## 2019-12-11 VITALS
WEIGHT: 290.7 LBS | BODY MASS INDEX: 39.37 KG/M2 | OXYGEN SATURATION: 99 % | DIASTOLIC BLOOD PRESSURE: 81 MMHG | RESPIRATION RATE: 20 BRPM | TEMPERATURE: 98.5 F | HEART RATE: 90 BPM | SYSTOLIC BLOOD PRESSURE: 142 MMHG | HEIGHT: 72 IN

## 2019-12-11 DIAGNOSIS — Z79.4 ENCOUNTER FOR LONG-TERM (CURRENT) USE OF INSULIN (HCC): ICD-10-CM

## 2019-12-11 DIAGNOSIS — E78.2 MIXED HYPERLIPIDEMIA: ICD-10-CM

## 2019-12-11 DIAGNOSIS — Z98.84 S/P BARIATRIC SURGERY: ICD-10-CM

## 2019-12-11 DIAGNOSIS — E27.9 DISORDER OF ADRENAL GLAND (HCC): ICD-10-CM

## 2019-12-11 DIAGNOSIS — E24.9 HYPERCORTISOLISM (HCC): ICD-10-CM

## 2019-12-11 DIAGNOSIS — D49.7 ADRENAL TUMOR: Primary | ICD-10-CM

## 2019-12-11 NOTE — PROGRESS NOTES
1. Have you been to the ER, urgent care clinic since your last visit? No  Hospitalized since your last visit? No    2. Have you seen or consulted any other health care providers outside of the [address] [place] [address] since your last visit? Include any pap smears or colon screening. No    Wt Readings from Last 3 Encounters:   12/11/19 290 lb 11.2 oz (131.9 kg)   10/15/19 306 lb 8 oz (139 kg)   06/15/18 285 lb 3.2 oz (129.4 kg)     Temp Readings from Last 3 Encounters:   12/11/19 98.5 °F (36.9 °C) (Oral)   10/15/19 97.5 °F (36.4 °C) (Oral)   06/15/18 98 °F (36.7 °C) (Oral)     BP Readings from Last 3 Encounters:   12/11/19 142/81   10/15/19 121/80   06/15/18 113/64     Pulse Readings from Last 3 Encounters:   12/11/19 90   10/15/19 91   06/15/18 (!) 101     Lab Results   Component Value Date/Time    Hemoglobin A1c 9.9 (H) 10/08/2019 08:51 AM    Hemoglobin A1c (POC) 7.1 12/05/2017 03:54 PM    Hemoglobin A1c, External 10.2 10/04/2015     Patient does not have meter today.   Patient needs eye exam referral.

## 2019-12-11 NOTE — LETTER
12/11/19 Patient: Thomas Gómez YOB: 1975 Date of Visit: 12/11/2019 Adeola Patel MD 
[address] [address] VIA Facsimile: [phone] Dear Adeola Patel MD, Thank you for referring Ms. Cierra Montes De Oca to [facility] for evaluation. My notes for this consultation are attached. If you have questions, please do not hesitate to call me. I look forward to following your patient along with you. Sincerely, Aly Magallanes MD

## 2019-12-11 NOTE — PROGRESS NOTES
HISTORY OF PRESENT ILLNESS  Winter Smith is a 38  y.o. female. HPI  Patient here for f/u visit for Type 2 diabetes mellitus   And  for adrenal nodule   after last visit from October 2019    Lost 16 lbs   Tolerating ozempic   No meter     Last LMP  -  Nov 18 2019   Started on spironolactone already along with  OCP           Old history      A long GAP    Pt is emotional   She had lost her mother  And is quite depressed   Gained 14 lbs   She wants to do better         Old history :    She had right second toe infection and stayed in hospital in jan 2018   She had I/v antibiotics     She got no meter   Sugars are high   She had allergic reaction  To vancomycin and had to get steroids for it   She received novolog     She ran out of insulins           Old history   Has not followed up for more than a year     She is s/p  bariatric surgery done , gastric sleeve , by Dr. Kevon Mark on oct 17 2016   She lost 45 lbs post surgery       She had 15 lbs since last visit     She had  Multiple infections, the major one was cholycystectomy   Had multiple imaging test done , and that showed incidental nodule on adrenal glands     Every time she joins the gym, she fractures   She has charcots joints       Review of Systems   Constitutional: Negative. HENT: Negative. Eyes: Negative for pain and redness. Respiratory: Negative. Cardiovascular: Negative for chest pain, palpitations and leg swelling. Gastrointestinal: Negative. Negative for constipation. Genitourinary: Negative. Musculoskeletal: Negative for myalgias. Skin: Negative. Neurological: Negative. Endo/Heme/Allergies: Negative. Psychiatric/Behavioral: Negative for depression and memory loss. The patient does not have insomnia. Physical Exam   Constitutional: She is oriented to person, place, and time. She appears well-developed and well-nourished. HENT: Body mass index is 38.35 kg/m². Head: Normocephalic.    Eyes: Conjunctivae and EOM are normal. Pupils are equal, round, and reactive to light. Neck: Normal range of motion. Neck supple. No JVD present. No tracheal deviation present. No thyromegaly present. Cardiovascular: Normal rate, regular rhythm and normal heart sounds. No murmur heard. Pulmonary/Chest: Breath sounds normal.   Abdominal: Soft. Bowel sounds are normal.   Musculoskeletal: Normal range of motion. Lymphadenopathy:   She has no cervical adenopathy. Neurological: She is alert and oriented to person, place, and time. She has normal reflexes. Skin: Skin is warm. Psychiatric: She has a normal mood and affect.      Diabetic foot exam: April 2018    Left: Reflexes nd     Vibratory sensation diminished    Proprioception nd   Sharp/dull discrimination nd    Filament test reduced sensation with micro filament   Pulse DP: 2+ (normal)   Pulse PT: nd   Deformities: None, dorsiflexed toes, rough plantar skin   Right: Reflexes nd   Vibratory sensation diminished   Proprioception nd   Sharp/dull discrimination nd   Filament test reduced sensation with micro filament   Pulse DP: 2+ (normal)   Pulse PT: nd   Deformities: Yes - dorsiflexed toes, ulcer healed on second toe ( charcots joints )          She had A1c in [facility] 7.5 %    Oct 28 thru nov 3   2017    Lab Results   Component Value Date/Time    Hemoglobin A1c 9.9 (H) 10/08/2019 08:51 AM    Hemoglobin A1c 9.3 (H) 02/22/2018 08:11 AM    Hemoglobin A1c 8.5 (H) 03/11/2016 01:23 PM    Hemoglobin A1c, External 10.2 10/04/2015    Glucose 253 (H) 10/08/2019 08:51 AM    Glucose POC 95 12/05/2017 03:51 PM    Microalb/Creat ratio (ug/mg creat.) 10.6 10/08/2019 08:51 AM    LDL, calculated 104 (H) 10/08/2019 08:51 AM    Creatinine 0.70 10/08/2019 08:51 AM      Lab Results   Component Value Date/Time    Cholesterol, total 210 (H) 10/08/2019 08:51 AM    HDL Cholesterol 34 (L) 10/08/2019 08:51 AM    LDL, calculated 104 (H) 10/08/2019 08:51 AM    Triglyceride 358 (H) 10/08/2019 08:51 AM Lab Results   Component Value Date/Time    ALT (SGPT) 15 10/08/2019 08:51 AM    AST (SGOT) 16 10/08/2019 08:51 AM    Alk. phosphatase 54 10/08/2019 08:51 AM    Bilirubin, total 0.6 10/08/2019 08:51 AM    Albumin 3.8 10/08/2019 08:51 AM    Protein, total 6.2 10/08/2019 08:51 AM    PLATELET 998 72/33/5394 05:20 PM     Lab Results   Component Value Date/Time    GFR est non- 10/08/2019 08:51 AM    GFR est  10/08/2019 08:51 AM    Creatinine 0.70 10/08/2019 08:51 AM    BUN 8 10/08/2019 08:51 AM    Sodium 139 10/08/2019 08:51 AM    Potassium 4.5 10/08/2019 08:51 AM    Chloride 99 10/08/2019 08:51 AM    CO2 25 10/08/2019 08:51 AM    Magnesium 1.8 10/15/2015 09:59 AM    Phosphorus 5.8 (H) 10/15/2015 09:59 AM             ASSESSMENT and PLAN    1. Type 2 DM, un controlled : A1c is  9.9 %   From oct 2019   compared to      9.3 %      From      Feb 2018    Compared to    7.5 %      From  Oct 2017      Compared to   7.2 %  Today   From  Oct 2016 compared to   8.5 %     From   March 2016  Compared to 8.4 %   From dec 2015   compared to    10.2 %     From oct 2015 comapred to  7.4 %  From June 2014 compared to  9.6 % from MArch 2014 compared to  7.7 % march 2013  Compared to  9.9 % March 2012 compared to 8.7% in march 2010    She is s/p gastric sleeve surgery  -  Oct 2016   She has measurable c-pep , definitely type 2 diabetic    (? Normoglycemic DKA , unexpected on her hospitalization  At [address] while on [facility] )  Post bariatric surgery , she had to be on insulin     Stay on Lantus   meal time insulin   She did not bring her meter to  the glycemic insight again this visit too    Off  Bydureon  On metformin 1000 mg  2 pills   a day   Increased  OZEMPIC to 1 mg a week     Patient is advised about checking blood sugars 4 times a day and maintaining log book. .   lab results and schedule of future lab studies reviewed with patient       2. Hypoglycemia : Educated on treating the hypoglycemia.  Discussed insulin use 3. HTN : well controlled on  benicar- hctz,  Patient is educated about importance of compliance with anti-hypertensives especially ARB/ACEI     4. Dyslipidemia : lipids are IN CONTROL   . Patient is educated about benefits and adverse effects of statins and explained how benefits outweigh risk. 5. use of aspirin to prevent MI and TIA's discussed     6. PCOS- on  spironolactone   She got back onto OCP         7. Obesity : made up her mind to  undergo bariatric surgery, sleeve versus bypass   Body mass index is 38.35 kg/m². S/p gastric sleeve surgery  Suggested the use of freestyle chaz, patient refused      8. 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 Right adrenal is normal .    There is a possibility of  sub-clinical cushings disease  She had unsuppressed  >1.8  Cortisol   2018 on 1 mg DST  , repeated twice in April 2018 and may 2018   She had suppressed  0.9 cortisol 2019 on 1 mg DST     She has hypercortisolism by labs and physical appearance   Pt is willing to try Himanshu Torreses     Starting on spironolactone and is on OCP   WILL DO kORLYM AFTER CT SCAN AS A TRIAL TO SEE IF THERE IS CLINICAL IMPROVEMENT     As there are 2 nodules on the left side , there is a possibility of micro nodules on  Right gland   this needs clarification by adrenal vein sampling in case of  lap adrenalectomy .         CHARCOTS JOINT ARHROPATHY       > 50 % of time is spent on counseling ON COMPLEX DECISION MAKING TO DECIDE ON MEDICATION VERSUS INVASIVE TESTING Patient voiced understanding her plan of care         F/u  in 2  months

## 2019-12-11 NOTE — PATIENT INSTRUCTIONS
-------------------------------------------------------------------------------------------- Refills    -    please call your pharmacy and have them send us a refill request 
 
Results  -  allow up to a week for lab results to be processed and reviewed. Phone calls  -  Allow upto 24 hrs. for non-urgent calls to be retained Prior authorization - It may take up to 2 weeks to process, depending on your insurance Forms  -  FMLA, DMV, patient assistance, etc. will take up to 2 weeks to process Cancellations - please notify the office in advance if you cannot keep your appointment Samples  - will only be dispensed at visits as supply is limited If you are having a medical emergency call 911 
 
-------------------------------------------------------------------------------------------- Increase  OZEMPIC   1 MG A WEEK Increase spironolactome 100 mg twice a day only upon starting korlym  ( stay on OCP  ) Stay on Metformin 500 mg 2 pills once a day  With  meals Check blood sugars right before each meal and at bed time Tresiba   insulin 50  units at bed time Stay  novolog  At  8 units with each meal  
Add additional novolog by sliding scale  as folllows with meals: 
 
150-200 mg 2 units 201-250 mg 4 units 251-300 mg 6 units 301-350 mg 8 units 351-400 mg 10 units 401-450 mg 12 units 451-500 mg 14 units Less than 70 mg NO INSULIN  
 
---------------------------------------------------------------------------------------------------------------------------------------------------

## 2019-12-13 ENCOUNTER — TELEPHONE (OUTPATIENT)
Dept: ENDOCRINOLOGY | Age: 44
End: 2019-12-13

## 2019-12-13 RX ORDER — BLOOD-GLUCOSE METER
EACH MISCELLANEOUS
Qty: 1 EACH | Refills: 0 | Status: SHIPPED | OUTPATIENT
Start: 2019-12-13 | End: 2020-02-04

## 2019-12-13 NOTE — TELEPHONE ENCOUNTER
----- Message from Cole Jameson sent at 12/12/2019  3:26 PM EST -----  Regarding: Dr. Ismael Lawton Message/Vendor Calls    Caller's first and last name:  Consuelo Amezquita from [facility]    Reason for call: prior authorization for \"Korlym\"      Callback required yes/no and why: yes      Best contact number(s):  [phone]    Details to clarify the request:      Cole Jameson

## 2019-12-13 NOTE — TELEPHONE ENCOUNTER
----- Message from [employer] sent at 12/13/2019 11:53 AM EST -----  Regarding: Dr. Cortland Sandhoff  Medication Refill    Caller (if not patient):  Sylvia Major.      Relationship of caller (if not patient): Technician with [facility], [place], [address] [facility] [place] contact number(s):   [phone]      Name of medication and dosage if known:  Accu-Chek Svitlana Meter, Test strips and Lancets      Is patient out of this medication (yes/no): Unknown, pharmacy will contact pt. Pharmacy name:  [facility]    Pharmacy listed in chart? (yes/no):  Yes  Pharmacy phone number: [phone] Fax [phone]      Details to clarify the request:  Ryann Pappas stated, a request for pt's \"One Touch Verio test strips\" was Escribed and received this morning. However, [place] will not cover One Touch and is recommending the Accu-Chek system.       [employer]

## 2019-12-16 ENCOUNTER — TELEPHONE (OUTPATIENT)
Dept: ENDOCRINOLOGY | Age: 44
End: 2019-12-16

## 2019-12-16 DIAGNOSIS — E11.65 TYPE 2 DIABETES MELLITUS WITH HYPERGLYCEMIA, WITHOUT LONG-TERM CURRENT USE OF INSULIN (HCC): Primary | ICD-10-CM

## 2019-12-16 RX ORDER — SEMAGLUTIDE 1.34 MG/ML
INJECTION, SOLUTION SUBCUTANEOUS
Qty: 3 ML | Refills: 3 | OUTPATIENT
Start: 2019-12-16

## 2019-12-16 NOTE — TELEPHONE ENCOUNTER
Two pt identifiers confirmed. Pharmacist states, medication for accu-chek has already been sold. Disregard message. Pharmacist verbalized understanding of information discussed w/ no further questions at this time.

## 2019-12-16 NOTE — TELEPHONE ENCOUNTER
----- Message from Gerhard Peraza sent at 12/13/2019 11:53 AM EST -----  Regarding: Dr. Anne Barnhart  Medication Refill    Caller (if not patient):  Mati Narayanan.      Relationship of caller (if not patient): Technician with [facility], [place], [address] [facility] [place] contact number(s):   [phone]      Name of medication and dosage if known:  Accu-Chek Svitlana Meter, Test strips and Lancets      Is patient out of this medication (yes/no): Unknown, pharmacy will contact pt. Pharmacy name:  [facility]    Pharmacy listed in chart? (yes/no):  Yes  Pharmacy phone number: [phone] Fax [phone]      Details to clarify the request:  Sayra Cannon stated, a request for pt's \"One Touch Verio test strips\" was Escribed and received this morning. However, [facility] will not cover One Touch and is recommending the Accu-Chek system.       Gerhard Peraza

## 2020-02-04 ENCOUNTER — HOSPITAL ENCOUNTER (INPATIENT)
Age: 45
LOS: 3 days | Discharge: HOME | DRG: 571 | End: 2020-02-07
Attending: EMERGENCY MEDICINE | Admitting: INTERNAL MEDICINE
Payer: OTHER MISCELLANEOUS

## 2020-02-04 ENCOUNTER — APPOINTMENT (OUTPATIENT)
Dept: GENERAL RADIOLOGY | Age: 45
DRG: 571 | End: 2020-02-04
Attending: NURSE PRACTITIONER
Payer: OTHER MISCELLANEOUS

## 2020-02-04 DIAGNOSIS — S91.331A PUNCTURE WOUND OF RIGHT FOOT, INITIAL ENCOUNTER: ICD-10-CM

## 2020-02-04 DIAGNOSIS — L03.119 CELLULITIS IN DIABETIC FOOT (HCC): Primary | ICD-10-CM

## 2020-02-04 DIAGNOSIS — E11.628 CELLULITIS IN DIABETIC FOOT (HCC): Primary | ICD-10-CM

## 2020-02-04 PROBLEM — E24.0 CUSHING DISEASE (HCC): Status: ACTIVE | Noted: 2020-02-04

## 2020-02-04 PROBLEM — L03.90 CELLULITIS: Status: ACTIVE | Noted: 2020-02-04

## 2020-02-04 LAB
ALBUMIN SERPL-MCNC: 3 G/DL (ref 3.5–5)
ALBUMIN/GLOB SERPL: 0.7 {RATIO} (ref 1.1–2.2)
ALP SERPL-CCNC: 57 U/L (ref 45–117)
ALT SERPL-CCNC: 11 U/L (ref 12–78)
ANION GAP SERPL CALC-SCNC: 9 MMOL/L (ref 5–15)
AST SERPL-CCNC: 22 U/L (ref 15–37)
BASOPHILS # BLD: 0.1 K/UL (ref 0–0.1)
BASOPHILS NFR BLD: 0 % (ref 0–1)
BILIRUB SERPL-MCNC: 0.7 MG/DL (ref 0.2–1)
BUN SERPL-MCNC: 15 MG/DL (ref 6–20)
BUN/CREAT SERPL: 19 (ref 12–20)
CALCIUM SERPL-MCNC: 8.8 MG/DL (ref 8.5–10.1)
CHLORIDE SERPL-SCNC: 105 MMOL/L (ref 97–108)
CO2 SERPL-SCNC: 22 MMOL/L (ref 21–32)
CREAT SERPL-MCNC: 0.81 MG/DL (ref 0.55–1.02)
CRP SERPL-MCNC: 13.1 MG/DL (ref 0–0.6)
DIFFERENTIAL METHOD BLD: ABNORMAL
EOSINOPHIL # BLD: 0.2 K/UL (ref 0–0.4)
EOSINOPHIL NFR BLD: 1 % (ref 0–7)
ERYTHROCYTE [DISTWIDTH] IN BLOOD BY AUTOMATED COUNT: 12.7 % (ref 11.5–14.5)
ERYTHROCYTE [SEDIMENTATION RATE] IN BLOOD: 67 MM/HR (ref 0–20)
GLOBULIN SER CALC-MCNC: 4.6 G/DL (ref 2–4)
GLUCOSE BLD STRIP.AUTO-MCNC: 133 MG/DL (ref 65–100)
GLUCOSE SERPL-MCNC: 114 MG/DL (ref 65–100)
HCT VFR BLD AUTO: 39.5 % (ref 35–47)
HGB BLD-MCNC: 12.9 G/DL (ref 11.5–16)
IMM GRANULOCYTES # BLD AUTO: 0.1 K/UL (ref 0–0.04)
IMM GRANULOCYTES NFR BLD AUTO: 0 % (ref 0–0.5)
LACTATE SERPL-SCNC: 0.7 MMOL/L (ref 0.4–2)
LYMPHOCYTES # BLD: 1.6 K/UL (ref 0.8–3.5)
LYMPHOCYTES NFR BLD: 10 % (ref 12–49)
MCH RBC QN AUTO: 31 PG (ref 26–34)
MCHC RBC AUTO-ENTMCNC: 32.7 G/DL (ref 30–36.5)
MCV RBC AUTO: 95 FL (ref 80–99)
MONOCYTES # BLD: 1 K/UL (ref 0–1)
MONOCYTES NFR BLD: 6 % (ref 5–13)
NEUTS SEG # BLD: 12.9 K/UL (ref 1.8–8)
NEUTS SEG NFR BLD: 83 % (ref 32–75)
NRBC # BLD: 0 K/UL (ref 0–0.01)
NRBC BLD-RTO: 0 PER 100 WBC
PLATELET # BLD AUTO: 306 K/UL (ref 150–400)
PMV BLD AUTO: 11 FL (ref 8.9–12.9)
POTASSIUM SERPL-SCNC: 4.7 MMOL/L (ref 3.5–5.1)
PROT SERPL-MCNC: 7.6 G/DL (ref 6.4–8.2)
RBC # BLD AUTO: 4.16 M/UL (ref 3.8–5.2)
SERVICE CMNT-IMP: ABNORMAL
SODIUM SERPL-SCNC: 136 MMOL/L (ref 136–145)
WBC # BLD AUTO: 15.7 K/UL (ref 3.6–11)

## 2020-02-04 PROCEDURE — 80053 COMPREHEN METABOLIC PANEL: CPT

## 2020-02-04 PROCEDURE — 86140 C-REACTIVE PROTEIN: CPT

## 2020-02-04 PROCEDURE — 82962 GLUCOSE BLOOD TEST: CPT

## 2020-02-04 PROCEDURE — 87040 BLOOD CULTURE FOR BACTERIA: CPT

## 2020-02-04 PROCEDURE — 74011250636 HC RX REV CODE- 250/636: Performed by: INTERNAL MEDICINE

## 2020-02-04 PROCEDURE — 85652 RBC SED RATE AUTOMATED: CPT

## 2020-02-04 PROCEDURE — 83605 ASSAY OF LACTIC ACID: CPT

## 2020-02-04 PROCEDURE — 74011000250 HC RX REV CODE- 250: Performed by: EMERGENCY MEDICINE

## 2020-02-04 PROCEDURE — 74011250636 HC RX REV CODE- 250/636: Performed by: EMERGENCY MEDICINE

## 2020-02-04 PROCEDURE — 73630 X-RAY EXAM OF FOOT: CPT

## 2020-02-04 PROCEDURE — 36415 COLL VENOUS BLD VENIPUNCTURE: CPT

## 2020-02-04 PROCEDURE — 99283 EMERGENCY DEPT VISIT LOW MDM: CPT

## 2020-02-04 PROCEDURE — 85025 COMPLETE CBC W/AUTO DIFF WBC: CPT

## 2020-02-04 PROCEDURE — 65270000029 HC RM PRIVATE

## 2020-02-04 RX ORDER — BUPROPION HYDROCHLORIDE 150 MG/1
150 TABLET, EXTENDED RELEASE ORAL DAILY
Status: DISCONTINUED | OUTPATIENT
Start: 2020-02-05 | End: 2020-02-04

## 2020-02-04 RX ORDER — CLINDAMYCIN HYDROCHLORIDE 300 MG/1
300 CAPSULE ORAL 4 TIMES DAILY
COMMUNITY
End: 2020-02-07

## 2020-02-04 RX ORDER — SODIUM CHLORIDE 9 MG/ML
100 INJECTION, SOLUTION INTRAVENOUS CONTINUOUS
Status: DISPENSED | OUTPATIENT
Start: 2020-02-04 | End: 2020-02-05

## 2020-02-04 RX ORDER — INSULIN LISPRO 100 [IU]/ML
INJECTION, SOLUTION INTRAVENOUS; SUBCUTANEOUS
Status: DISCONTINUED | OUTPATIENT
Start: 2020-02-04 | End: 2020-02-07 | Stop reason: HOSPADM

## 2020-02-04 RX ORDER — ENOXAPARIN SODIUM 100 MG/ML
40 INJECTION SUBCUTANEOUS EVERY 24 HOURS
Status: DISCONTINUED | OUTPATIENT
Start: 2020-02-05 | End: 2020-02-07 | Stop reason: HOSPADM

## 2020-02-04 RX ORDER — SODIUM CHLORIDE 9 MG/ML
100 INJECTION, SOLUTION INTRAVENOUS CONTINUOUS
Status: DISCONTINUED | OUTPATIENT
Start: 2020-02-04 | End: 2020-02-04

## 2020-02-04 RX ORDER — LINEZOLID 2 MG/ML
600 INJECTION, SOLUTION INTRAVENOUS
Status: COMPLETED | OUTPATIENT
Start: 2020-02-04 | End: 2020-02-04

## 2020-02-04 RX ORDER — BUPROPION HYDROCHLORIDE 150 MG/1
150 TABLET, EXTENDED RELEASE ORAL DAILY
Status: DISCONTINUED | OUTPATIENT
Start: 2020-02-06 | End: 2020-02-07 | Stop reason: HOSPADM

## 2020-02-04 RX ORDER — ACETAMINOPHEN 325 MG/1
650 TABLET ORAL
Status: DISCONTINUED | OUTPATIENT
Start: 2020-02-04 | End: 2020-02-07 | Stop reason: HOSPADM

## 2020-02-04 RX ORDER — SPIRONOLACTONE 50 MG/1
50 TABLET, FILM COATED ORAL DAILY
COMMUNITY
End: 2020-02-20 | Stop reason: SDUPTHER

## 2020-02-04 RX ORDER — MAGNESIUM SULFATE 100 %
4 CRYSTALS MISCELLANEOUS AS NEEDED
Status: DISCONTINUED | OUTPATIENT
Start: 2020-02-04 | End: 2020-02-07 | Stop reason: HOSPADM

## 2020-02-04 RX ORDER — METRONIDAZOLE 500 MG/100ML
500 INJECTION, SOLUTION INTRAVENOUS EVERY 12 HOURS
Status: DISCONTINUED | OUTPATIENT
Start: 2020-02-05 | End: 2020-02-07 | Stop reason: HOSPADM

## 2020-02-04 RX ORDER — SPIRONOLACTONE 25 MG/1
50 TABLET ORAL DAILY
Status: DISCONTINUED | OUTPATIENT
Start: 2020-02-05 | End: 2020-02-07 | Stop reason: HOSPADM

## 2020-02-04 RX ORDER — SPIRONOLACTONE 25 MG/1
50 TABLET ORAL 2 TIMES DAILY
Status: DISCONTINUED | OUTPATIENT
Start: 2020-02-05 | End: 2020-02-04

## 2020-02-04 RX ORDER — METRONIDAZOLE 500 MG/100ML
500 INJECTION, SOLUTION INTRAVENOUS
Status: COMPLETED | OUTPATIENT
Start: 2020-02-04 | End: 2020-02-04

## 2020-02-04 RX ORDER — DEXTROSE MONOHYDRATE 100 MG/ML
0-250 INJECTION, SOLUTION INTRAVENOUS AS NEEDED
Status: DISCONTINUED | OUTPATIENT
Start: 2020-02-04 | End: 2020-02-07 | Stop reason: HOSPADM

## 2020-02-04 RX ORDER — NALOXONE HYDROCHLORIDE 0.4 MG/ML
0.4 INJECTION, SOLUTION INTRAMUSCULAR; INTRAVENOUS; SUBCUTANEOUS AS NEEDED
Status: DISCONTINUED | OUTPATIENT
Start: 2020-02-04 | End: 2020-02-07 | Stop reason: HOSPADM

## 2020-02-04 RX ORDER — INSULIN GLARGINE 100 [IU]/ML
25 INJECTION, SOLUTION SUBCUTANEOUS DAILY
Status: DISCONTINUED | OUTPATIENT
Start: 2020-02-05 | End: 2020-02-07 | Stop reason: HOSPADM

## 2020-02-04 RX ORDER — INSULIN ASPART 100 [IU]/ML
8-15 INJECTION, SOLUTION INTRAVENOUS; SUBCUTANEOUS
COMMUNITY
End: 2020-03-13 | Stop reason: SDUPTHER

## 2020-02-04 RX ORDER — SODIUM CHLORIDE 0.9 % (FLUSH) 0.9 %
5-40 SYRINGE (ML) INJECTION EVERY 8 HOURS
Status: DISCONTINUED | OUTPATIENT
Start: 2020-02-04 | End: 2020-02-07 | Stop reason: HOSPADM

## 2020-02-04 RX ORDER — SODIUM CHLORIDE 0.9 % (FLUSH) 0.9 %
5-40 SYRINGE (ML) INJECTION AS NEEDED
Status: DISCONTINUED | OUTPATIENT
Start: 2020-02-04 | End: 2020-02-07 | Stop reason: HOSPADM

## 2020-02-04 RX ORDER — METFORMIN HYDROCHLORIDE 500 MG/1
1000 TABLET ORAL
COMMUNITY
End: 2020-03-13 | Stop reason: SDUPTHER

## 2020-02-04 RX ADMIN — WATER 2 G: 1 INJECTION INTRAMUSCULAR; INTRAVENOUS; SUBCUTANEOUS at 21:05

## 2020-02-04 RX ADMIN — Medication 10 ML: at 22:48

## 2020-02-04 RX ADMIN — LINEZOLID 600 MG: 600 INJECTION, SOLUTION INTRAVENOUS at 21:10

## 2020-02-04 RX ADMIN — METRONIDAZOLE 500 MG: 500 INJECTION, SOLUTION INTRAVENOUS at 21:07

## 2020-02-04 RX ADMIN — SODIUM CHLORIDE 100 ML/HR: 900 INJECTION, SOLUTION INTRAVENOUS at 22:47

## 2020-02-04 NOTE — ED TRIAGE NOTES
Triage: Stepped on a sensor tag with right foot and the sharp part went thru her shoe last Friday. Went to [facility] Saturday was started on Clindamycin Sunday. Reports that her foot is still red and swollen. HX: DM   Unsure if tetanus is up to date.

## 2020-02-04 NOTE — ED PROVIDER NOTES
38 y.o. female with past medical history significant for DM who presents from private vehicle with chief complaint of foot injury. Pt arrives to the ED with c/o continuing right foot swelling, redness, and pain. Pt reports stepping on a sensor tag with her right foot on Friday, 01/31/20. Pt states the sharp part of the sensor tag went through her shoe. Pain started 5 hrs later along with heat, redness. Pt was seen at [facility] on Saturday 02/01/20. Pt started Clindamycin 2 days ago. Pt is unsure if her tetanus is UTD. Pt denies headache or chills. There are no other acute medical concerns at this time. PCP: Silvina Cronin MD    Note written by Ant Spaulding, as dictated by Jonatan Diallo DNP 5:10 PM      -----------------------------------------  Mild pain now  Worse with ambulation  Redness is actually a little improved ans not spread more proximally. 2nd toe is red now. She has a charcot foot chronically and it's always a little red and warm.     Loren Nicole, DO             Past Medical History:   Diagnosis Date    Broken foot 11/11    DM (diabetes mellitus) ([facility] [address]     Headache(784.0)        Past Surgical History:   Procedure Laterality Date    HX CHOLECYSTECTOMY      HX OTHER SURGICAL  10/2016    gastric sleeve     HX OVARIAN CYST REMOVAL      HX TONSILLECTOMY           Family History:   Problem Relation Age of Onset    Diabetes Mother     Hypertension Mother     Diabetes Father     Hypertension Father     Heart Disease Father     Breast Cancer Maternal Grandmother        Social History     Socioeconomic History    Marital status: SINGLE     Spouse name: Not on file    Number of children: Not on file    Years of education: Not on file    Highest education level: Not on file   Occupational History    Not on file   Social Needs    Financial resource strain: Not on file    Food insecurity:     Worry: Not on file     Inability: Not on file    Transportation needs: Medical: Not on file     Non-medical: Not on file   Tobacco Use    Smoking status: Never Smoker    Smokeless tobacco: Never Used   Substance and Sexual Activity    Alcohol use: Yes     Comment: socially    Drug use: No    Sexual activity: Not on file   Lifestyle    Physical activity:     Days per week: Not on file     Minutes per session: Not on file    Stress: Not on file   Relationships    Social connections:     Talks on phone: Not on file     Gets together: Not on file     Attends Scientologist service: Not on file     Active member of club or organization: Not on file     Attends meetings of clubs or organizations: Not on file     Relationship status: Not on file    Intimate partner violence:     Fear of current or ex partner: Not on file     Emotionally abused: Not on file     Physically abused: Not on file     Forced sexual activity: Not on file   Other Topics Concern    Not on file   Social History Narrative    Not on file         ALLERGIES: Vancomycin    Review of Systems   Constitutional: Negative for chills and fever. HENT: Negative for sore throat. Respiratory: Negative for cough and shortness of breath. Cardiovascular: Negative for chest pain. Gastrointestinal: Negative for abdominal pain and vomiting. Genitourinary: Negative for dysuria. Musculoskeletal: Positive for arthralgias and joint swelling. Negative for back pain. Skin: Positive for color change. Negative for rash. Neurological: Negative for syncope and headaches. Psychiatric/Behavioral: Negative for confusion. All other systems reviewed and are negative. Vitals:    02/04/20 1711   BP: 136/71   Pulse: 99   Resp: 16   Temp: 98.4 °F (36.9 °C)   SpO2: 99%   Weight: 124.7 kg (275 lb)   Height: 6' 1\" (1.854 m)            Physical Exam  Constitutional:       Appearance: She is obese. HENT:      Head: Normocephalic.       Nose: Nose normal.      Mouth/Throat:      Mouth: Mucous membranes are moist.   Eyes: Pupils: Pupils are equal, round, and reactive to light. Cardiovascular:      Rate and Rhythm: Normal rate and regular rhythm. Pulses: Normal pulses. Pulmonary:      Effort: Pulmonary effort is normal.   Abdominal:      Palpations: Abdomen is soft. Tenderness: There is no abdominal tenderness. Musculoskeletal:        Feet:    Skin:     General: Skin is warm and dry. Capillary Refill: Capillary refill takes less than 2 seconds. Neurological:      General: No focal deficit present. Mental Status: She is alert. Psychiatric:         Mood and Affect: Mood normal.          [facility]       Procedures    Hospitalist Perfect Serve for Admission  7:57 PM    ED Room Number: E42/O64  Patient Name and age:  Lamar Gong 38 y.o.  female  Working Diagnosis:   1. Cellulitis in diabetic foot ([address]    2. Puncture wound of right foot, initial encounter      Readmission: no  Isolation Requirements:  no  Recommended Level of Care:  med/surg  Code Status:  Full Code  Department:[facility] ED - [phone]  Other:    Sustained puncture wound at work on Friday. 5 hours later, pain began in the foot with warmth. She went to [facility] on Saturday. She was prescribed clindamycin. She started the clindamycin Sunday morning has been compliant ever since. Foot is erythematous, tender, warm. X-ray is negative. White blood cell count is 16. There is a possible abscess. She has a vancomycin allergy that involves angioedema.       Recent Results (from the past 12 hour(s))   CBC WITH AUTOMATED DIFF    Collection Time: 02/04/20  5:39 PM   Result Value Ref Range    WBC 15.7 (H) 3.6 - 11.0 K/uL    RBC 4.16 3.80 - 5.20 M/uL    HGB 12.9 11.5 - 16.0 g/dL    HCT 39.5 35.0 - 47.0 %    MCV 95.0 80.0 - 99.0 FL    MCH 31.0 26.0 - 34.0 PG    MCHC 32.7 30.0 - 36.5 g/dL    RDW 12.7 11.5 - 14.5 %    PLATELET 845 276 - 700 K/uL    MPV 11.0 8.9 - 12.9 FL    NRBC 0.0 0  WBC    ABSOLUTE NRBC 0.00 0.00 - 0.01 K/uL NEUTROPHILS 83 (H) 32 - 75 %    LYMPHOCYTES 10 (L) 12 - 49 %    MONOCYTES 6 5 - 13 %    EOSINOPHILS 1 0 - 7 %    BASOPHILS 0 0 - 1 %    IMMATURE GRANULOCYTES 0 0.0 - 0.5 %    ABS. NEUTROPHILS 12.9 (H) 1.8 - 8.0 K/UL    ABS. LYMPHOCYTES 1.6 0.8 - 3.5 K/UL    ABS. MONOCYTES 1.0 0.0 - 1.0 K/UL    ABS. EOSINOPHILS 0.2 0.0 - 0.4 K/UL    ABS. BASOPHILS 0.1 0.0 - 0.1 K/UL    ABS. IMM. GRANS. 0.1 (H) 0.00 - 0.04 K/UL    DF AUTOMATED     METABOLIC PANEL, COMPREHENSIVE    Collection Time: 02/04/20  5:39 PM   Result Value Ref Range    Sodium 136 136 - 145 mmol/L    Potassium 4.7 3.5 - 5.1 mmol/L    Chloride 105 97 - 108 mmol/L    CO2 22 21 - 32 mmol/L    Anion gap 9 5 - 15 mmol/L    Glucose 114 (H) 65 - 100 mg/dL    BUN 15 6 - 20 MG/DL    Creatinine 0.81 0.55 - 1.02 MG/DL    BUN/Creatinine ratio 19 12 - 20      GFR est AA >60 >60 ml/min/1.73m2    GFR est non-AA >60 >60 ml/min/1.73m2    Calcium 8.8 8.5 - 10.1 MG/DL    Bilirubin, total 0.7 0.2 - 1.0 MG/DL    ALT (SGPT) 11 (L) 12 - 78 U/L    AST (SGOT) 22 15 - 37 U/L    Alk.  phosphatase 57 45 - 117 U/L    Protein, total 7.6 6.4 - 8.2 g/dL    Albumin 3.0 (L) 3.5 - 5.0 g/dL    Globulin 4.6 (H) 2.0 - 4.0 g/dL    A-G Ratio 0.7 (L) 1.1 - 2.2     SED RATE (ESR)    Collection Time: 02/04/20  5:39 PM   Result Value Ref Range    Sed rate, automated 67 (H) 0 - 20 mm/hr   C REACTIVE PROTEIN, QT    Collection Time: 02/04/20  5:39 PM   Result Value Ref Range    C-Reactive protein 13.10 (H) 0.00 - 0.60 mg/dL

## 2020-02-05 LAB
ALBUMIN SERPL-MCNC: 2.6 G/DL (ref 3.5–5)
ALBUMIN/GLOB SERPL: 0.7 {RATIO} (ref 1.1–2.2)
ALP SERPL-CCNC: 52 U/L (ref 45–117)
ALT SERPL-CCNC: 9 U/L (ref 12–78)
ANION GAP SERPL CALC-SCNC: 7 MMOL/L (ref 5–15)
AST SERPL-CCNC: 7 U/L (ref 15–37)
BASOPHILS # BLD: 0.1 K/UL (ref 0–0.1)
BASOPHILS NFR BLD: 1 % (ref 0–1)
BILIRUB SERPL-MCNC: 0.7 MG/DL (ref 0.2–1)
BUN SERPL-MCNC: 12 MG/DL (ref 6–20)
BUN/CREAT SERPL: 18 (ref 12–20)
CALCIUM SERPL-MCNC: 8.1 MG/DL (ref 8.5–10.1)
CHLORIDE SERPL-SCNC: 105 MMOL/L (ref 97–108)
CO2 SERPL-SCNC: 23 MMOL/L (ref 21–32)
CREAT SERPL-MCNC: 0.68 MG/DL (ref 0.55–1.02)
DIFFERENTIAL METHOD BLD: ABNORMAL
EOSINOPHIL # BLD: 0.1 K/UL (ref 0–0.4)
EOSINOPHIL NFR BLD: 1 % (ref 0–7)
ERYTHROCYTE [DISTWIDTH] IN BLOOD BY AUTOMATED COUNT: 12.5 % (ref 11.5–14.5)
EST. AVERAGE GLUCOSE BLD GHB EST-MCNC: 151 MG/DL
GLOBULIN SER CALC-MCNC: 3.9 G/DL (ref 2–4)
GLUCOSE BLD STRIP.AUTO-MCNC: 158 MG/DL (ref 65–100)
GLUCOSE BLD STRIP.AUTO-MCNC: 171 MG/DL (ref 65–100)
GLUCOSE BLD STRIP.AUTO-MCNC: 173 MG/DL (ref 65–100)
GLUCOSE BLD STRIP.AUTO-MCNC: 174 MG/DL (ref 65–100)
GLUCOSE SERPL-MCNC: 181 MG/DL (ref 65–100)
HBA1C MFR BLD: 6.9 % (ref 4–5.6)
HCT VFR BLD AUTO: 35.3 % (ref 35–47)
HGB BLD-MCNC: 11.8 G/DL (ref 11.5–16)
IMM GRANULOCYTES # BLD AUTO: 0.1 K/UL (ref 0–0.04)
IMM GRANULOCYTES NFR BLD AUTO: 1 % (ref 0–0.5)
LYMPHOCYTES # BLD: 1.7 K/UL (ref 0.8–3.5)
LYMPHOCYTES NFR BLD: 13 % (ref 12–49)
MAGNESIUM SERPL-MCNC: 1.8 MG/DL (ref 1.6–2.4)
MCH RBC QN AUTO: 31.1 PG (ref 26–34)
MCHC RBC AUTO-ENTMCNC: 33.4 G/DL (ref 30–36.5)
MCV RBC AUTO: 93.1 FL (ref 80–99)
MONOCYTES # BLD: 0.8 K/UL (ref 0–1)
MONOCYTES NFR BLD: 6 % (ref 5–13)
NEUTS SEG # BLD: 10 K/UL (ref 1.8–8)
NEUTS SEG NFR BLD: 78 % (ref 32–75)
NRBC # BLD: 0 K/UL (ref 0–0.01)
NRBC BLD-RTO: 0 PER 100 WBC
PHOSPHATE SERPL-MCNC: 2.5 MG/DL (ref 2.6–4.7)
PLATELET # BLD AUTO: 278 K/UL (ref 150–400)
PMV BLD AUTO: 10.8 FL (ref 8.9–12.9)
POTASSIUM SERPL-SCNC: 3.9 MMOL/L (ref 3.5–5.1)
PROT SERPL-MCNC: 6.5 G/DL (ref 6.4–8.2)
RBC # BLD AUTO: 3.79 M/UL (ref 3.8–5.2)
SERVICE CMNT-IMP: ABNORMAL
SODIUM SERPL-SCNC: 135 MMOL/L (ref 136–145)
WBC # BLD AUTO: 12.8 K/UL (ref 3.6–11)

## 2020-02-05 PROCEDURE — 0JBQ0ZZ EXCISION OF RIGHT FOOT SUBCUTANEOUS TISSUE AND FASCIA, OPEN APPROACH: ICD-10-PCS | Performed by: PODIATRIST

## 2020-02-05 PROCEDURE — 74011636637 HC RX REV CODE- 636/637: Performed by: INTERNAL MEDICINE

## 2020-02-05 PROCEDURE — 87186 SC STD MICRODIL/AGAR DIL: CPT

## 2020-02-05 PROCEDURE — 85025 COMPLETE CBC W/AUTO DIFF WBC: CPT

## 2020-02-05 PROCEDURE — 83735 ASSAY OF MAGNESIUM: CPT

## 2020-02-05 PROCEDURE — 74011250636 HC RX REV CODE- 250/636: Performed by: INTERNAL MEDICINE

## 2020-02-05 PROCEDURE — 36415 COLL VENOUS BLD VENIPUNCTURE: CPT

## 2020-02-05 PROCEDURE — 80053 COMPREHEN METABOLIC PANEL: CPT

## 2020-02-05 PROCEDURE — 74011000258 HC RX REV CODE- 258: Performed by: INTERNAL MEDICINE

## 2020-02-05 PROCEDURE — 87205 SMEAR GRAM STAIN: CPT

## 2020-02-05 PROCEDURE — 74011250637 HC RX REV CODE- 250/637: Performed by: INTERNAL MEDICINE

## 2020-02-05 PROCEDURE — 87077 CULTURE AEROBIC IDENTIFY: CPT

## 2020-02-05 PROCEDURE — 65270000029 HC RM PRIVATE

## 2020-02-05 PROCEDURE — 83036 HEMOGLOBIN GLYCOSYLATED A1C: CPT

## 2020-02-05 PROCEDURE — 82962 GLUCOSE BLOOD TEST: CPT

## 2020-02-05 PROCEDURE — 84100 ASSAY OF PHOSPHORUS: CPT

## 2020-02-05 RX ADMIN — Medication 10 ML: at 21:58

## 2020-02-05 RX ADMIN — ENOXAPARIN SODIUM 40 MG: 40 INJECTION SUBCUTANEOUS at 09:09

## 2020-02-05 RX ADMIN — DOXYCYCLINE 100 MG: 100 INJECTION, POWDER, LYOPHILIZED, FOR SOLUTION INTRAVENOUS at 19:42

## 2020-02-05 RX ADMIN — INSULIN LISPRO 2 UNITS: 100 INJECTION, SOLUTION INTRAVENOUS; SUBCUTANEOUS at 12:34

## 2020-02-05 RX ADMIN — ACETAMINOPHEN 650 MG: 325 TABLET ORAL at 19:23

## 2020-02-05 RX ADMIN — DOXYCYCLINE 100 MG: 100 INJECTION, POWDER, LYOPHILIZED, FOR SOLUTION INTRAVENOUS at 09:08

## 2020-02-05 RX ADMIN — INSULIN LISPRO 2 UNITS: 100 INJECTION, SOLUTION INTRAVENOUS; SUBCUTANEOUS at 17:49

## 2020-02-05 RX ADMIN — SPIRONOLACTONE 50 MG: 25 TABLET ORAL at 09:08

## 2020-02-05 RX ADMIN — CEFEPIME 2 G: 20 INJECTION, POWDER, FOR SOLUTION INTRAVENOUS at 05:39

## 2020-02-05 RX ADMIN — INSULIN LISPRO 2 UNITS: 100 INJECTION, SOLUTION INTRAVENOUS; SUBCUTANEOUS at 08:00

## 2020-02-05 RX ADMIN — Medication 10 ML: at 05:41

## 2020-02-05 RX ADMIN — CEFEPIME 2 G: 20 INJECTION, POWDER, FOR SOLUTION INTRAVENOUS at 21:57

## 2020-02-05 RX ADMIN — CEFEPIME 2 G: 20 INJECTION, POWDER, FOR SOLUTION INTRAVENOUS at 12:34

## 2020-02-05 RX ADMIN — INSULIN GLARGINE 25 UNITS: 100 INJECTION, SOLUTION SUBCUTANEOUS at 09:09

## 2020-02-05 RX ADMIN — METRONIDAZOLE 500 MG: 500 INJECTION, SOLUTION INTRAVENOUS at 21:57

## 2020-02-05 RX ADMIN — Medication 10 ML: at 14:00

## 2020-02-05 RX ADMIN — METRONIDAZOLE 500 MG: 500 INJECTION, SOLUTION INTRAVENOUS at 10:37

## 2020-02-05 NOTE — H&P
[facility]  [address] [facility], [facility] [address]  [phone]    Hospitalist Admission Note      NAME:  Maureen Catalan   :   1975   MRN:  [id number]     PCP:  Donn Pavon MD     Date of Service/Time:  2020 8:32 PM         Assessment / Plan:         Cellulitis: R foot, from stepping on needle from security sensor tag. Not improving on clindamycin. As charcot's foot. Severe allergy to IV vancomycin. Start cefepime/Flagyl. Use IV doxy for now, but if no improvement can change to daptomycin. Given a dose of linezolid in ED but would not continue this as pt is also on Wellbutrin (drug interaction, avoid concomitant use). Will ask general surgery to evaluate for possible underlying abscess. Defer imaging study, if needed, to general surg. SIRS: not septic. Due to the above. Follow on IV abx and IVF. Follow blood culture      DM [facility]di[facility]) ([facility] (): type 2. Appears poorly controlled based on A1c in October. On Tresiba 50 units at home. Use Lantus, lower dose. Start SSI. Repeat A1c      Essential hypertension: BP controlled. On Aldactone      Obesity / S/P bariatric surgery: Body mass index is 36.28 kg/m².' Has been successful in some weight loss in recent months      Cushing disease: on mifepristone and Aldactone       Depression: do not use Linezolid with Wellbutrin. In fact, will delay resuming home Wellbutrin until . Discussed with pharmacy       Code Status: FULL     ED notes and lab results reviewed.        Total time spent with patient: 79 Minutes   Time spent in the care of this patient included reviewing records, discussing with nursing, obtaining history and examining the patient, and discussing treatment plans, with >50% time spent counseling/coordinating care    Risk of deterioration: High                 Care Plan discussed with: ED provider, Patient, Nursing Staff and >50% of time spent in counseling and coordination of care    Discussed:  Care Plan and D/C Planning    Prophylaxis:  Lovenox    Disposition:  Home w/Family                 Subjective:     CHIEF COMPLAINT: R foot infection     HISTORY OF PRESENT ILLNESS:     Ms. Manda Vu is a 38 y.o. female w/ hx of DM, Cushing's who presents with R foot infection. Stepped on the needle end of a security sensor tag on Friday (4 days ago). On Saturday, went to [facility] and was started on clindamycin. Has been compliant with taking it however has noticed no improvement in pain, redness, swelling. Moderate, constant, worsening. NOT associated with fevers or chills. It was draining initially but none now. ED workup showed leukocytosis, elevated ESR/CRP. Ms. Manda Vu is admitted for further evaluation and management. Past Medical History:   Diagnosis Date    Broken foot 11/11    DM (diabetes mellitus) ([facility]     Headache(784.0)         Past Surgical History:   Procedure Laterality Date    HX CHOLECYSTECTOMY      HX OTHER SURGICAL  10/2016    gastric sleeve     HX OVARIAN CYST REMOVAL      HX TONSILLECTOMY         Social History     Tobacco Use    Smoking status: Never Smoker    Smokeless tobacco: Never Used   Substance Use Topics    Alcohol use: Yes     Comment: socially        Family History   Problem Relation Age of Onset    Diabetes Mother     Hypertension Mother     Diabetes Father     Hypertension Father     Heart Disease Father     Breast Cancer Maternal Grandmother         Allergies   Allergen Reactions    Vancomycin Rash        Prior to Admission medications    Medication Sig Start Date End Date Taking? Authorizing Provider   miFEPRIStone (KORLYM) 300 mg tab Once a day 12/18/19   Aundrea Lala MD   Blood-Glucose Meter (ACCU-CHEK TACO PLUS METER) misc Use to check blood sugar 4 times daily. Dx code E11.65 12/13/19   Aundrea Lala MD   glucose blood VI test strips (ACCU-CHEK TACO) strip Use to check blood sugar 4 times daily.   Dx code E11.65 12/13/19   Aundrea Lala MD   glucose blood VI test strips (BLOOD GLUCOSE TEST) strip Use to check blood sugars four times daily. Dx. Code E11.65 12/12/19   Aundrea Lala MD   semaglutide (OZEMPIC) 1 mg/dose (2 mg/1.5 mL) sub-q pen 1 mg by SubCUTAneous route every seven (7) days. 12/11/19   Aundrea Lala MD   glucose blood VI test strips (ONETOUCH VERIO) strip Use to check blood sugars four times daily. Dx. Code E11.65 12/11/19   Aundrea Lala MD   spironolactone (ALDACTONE) 50 mg tablet Take 1 Tab by mouth two (2) times a day. To start with [facility] 11/20/19   Aundrea Lala MD   drospirenone-ethinyl estradiol (Martha Quinn, 28,) 3-0.02 mg tab Once a day 11/20/19   Aundrea Lala MD   Blood-Glucose Meter (FREESTYLE LITE METER) monitoring kit Use to check blood sugars four times daily. Dx. Code E11.65 11/5/19   Aundrea Lala MD   glucose blood VI test strips (FREESTYLE LITE STRIPS) strip Use to check blood sugars four times daily. Dx. Code E11.65 11/5/19   Aundrea Lala MD   lancets (FREESTYLE LANCETS) 28 gauge misc Use to check blood sugars four times daily. Dx. Code E11.65 11/5/19   Aundrea Lala MD   buPROPion SR (WELLBUTRIN SR) 150 mg SR tablet TAKE 1 TABLET BY MOUTH EVERY DAY 10/29/19   Aundrea Lala MD   semaglutide (OZEMPIC) 1 mg/dose (2 mg/1.5 mL) sub-q pen SAMPLE 10/15/19   Aundrea Lala MD   Blood-Glucose Meter (ONETOUCH VERIO FLEX) misc Use to check blood sugars four times daily. Dx. Code E11.65 10/15/19   Aundrea Lala MD   lancets (ONE TOUCH DELICA) 33 gauge misc Use to check blood sugars four times daily.  Dx. Code E11.65 10/15/19   Aundrea Lala MD   metFORMIN (GLUCOPHAGE) 500 mg tablet Take one pill twice a day with meals 10/15/19   Aundrea Lala MD   insulin degludec (TRESIBA FLEXTOUCH U-100) 100 unit/mL (3 mL) inpn Increase INJECT 50 UNITS SUBCUTANEOUS ROUTE AT BEDTIME. 10/15/19   Aundrea Lala MD   Insulin Needles, Disposable, (BD ULTRA-FINE SHORT PEN NEEDLE) 31 gauge x 5/16\" ndle USE AS DIRECTED 4 TIMES A DAY. DX. CODE E11.65 10/15/19   Aundrea Lala MD   insulin aspart U-100 (NOVOLOG) 100 unit/mL (3 mL) inpn Inject 8 units before breakfast, lunch, and dinner. Plus sliding scale. Max daily dose 66 units 10/15/19   Aundrea Lala MD   lancets (ACCU-CHEK SOFTCLIX LANCETS) misc Use to check blood sugar 4 times daily. Dx code E11.65 5/9/19   Aundrea Lala MD   olmesartan-hydroCHLOROthiazide (BENICAR HCT) 40-12.5 mg per tablet TAKE 1 TABLET BY MOUTH EVERY DAY 6/25/18   Aundrea Lala MD   Insulin Needles, Disposable, 30 gauge x 1/3\" Check blood sugar four times a day E11.65 4/16/18   Aundrea Lala MD   pantoprazole (PROTONIX) 40 mg tablet TK 1 T PO D 7/24/17   Provider, Historical       Review of Systems:  (bold if positive, if negative)    Gen:  fatigueEyes:  ENT:  CVS:  Pulm:  GI:  :  MS:  Pain,Skin:  Rash, erythema,woundPsych:  Endo:  Hem:  Renal:  Neuro:            Objective:      VITALS:    Vital signs reviewed; most recent are:    Visit Vitals  /71 (BP 1 Location: Right arm, BP Patient Position: At rest)   Pulse 99   Temp 98.4 °F (36.9 °C)   Resp 16   Ht 6' 1\" (1.854 m)   Wt 124.7 kg (275 lb)   SpO2 99%   BMI 36.28 kg/m²     SpO2 Readings from Last 6 Encounters:   02/04/20 99%   12/11/19 99%   10/15/19 99%   06/15/18 99%   04/16/18 100%   12/05/17 98%        No intake or output data in the 24 hours ending 02/04/20 2032         Exam:     Physical Exam:    Gen:  Well-developed, well-nourished, in no acute distress. Pleasant   HEENT:  No scleral icterus,  hearing intact to voice, moist mucous membranes  Neck:  Supple, without masses. Thyroid non-tender  Resp:  No accessory muscle use. CTAB without wheezing, rales, rhonchi  Card: RRR. Normal S1 and S2 without murmurs, rubs, or gallops. No peripheral lower extremity edema. No JVD. Peripheral pulses in tact. Abd:  Normoactive bowel sounds. Soft, non-tender, non-distended. No rebound, no guarding.  No appreciable hepatosplenomegaly   Lymph:  No cervical adenopathy  Musc:  No cyanosis or clubbing  Skin:  R foot redness, swelling, TTP. Cap refill ~2 secs  See pictures          Neuro:  Cranial nerves are grossly intact, no focal motor weakness, follows commands appropriately  Psych:  Good insight, normal affect. Alert, oriented x 3. Answers questions appropriately       Labs:    Recent Labs     02/04/20  1739   WBC 15.7*   HGB 12.9   HCT 39.5        Recent Labs     02/04/20  1739      K 4.7      CO2 22   *   BUN 15   CREA 0.81   CA 8.8   ALB 3.0*   SGOT 22   ALT 11*     No components found for: GLPOC  No results for input(s): PH, PCO2, PO2, HCO3, FIO2 in the last 72 hours. No results for input(s): INR, INREXT in the last 72 hours. Lab Results   Component Value Date/Time    Specimen Description: URINE 08/06/2009 11:40 PM     Lab Results   Component Value Date/Time    Culture result: NO SIGNIFICANT GROWTH 08/06/2009 11:40 PM     All other current labs reviewed in the computer. Imaging/Studies:    Xr Foot Rt Min 3 V    Result Date: 2/4/2020  IMPRESSION: No acute fracture or dislocation. Three views of the right foot demonstrate chronic osseous fusion at  the base of the fifth metatarsal. There is soft tissue edema demonstrated    Imaging personally reviewed.     ___________________________________________________    Attending Physician: Alexander Kelley MD

## 2020-02-05 NOTE — ROUTINE PROCESS
Bedside shift change report given to Hillary Kruger (oncoming nurse) by Alisa Pepper (offgoing nurse). Report included the following information SBAR, Kardex, Intake/Output, MAR, Accordion, Recent Results and Med Rec Status.

## 2020-02-05 NOTE — PROGRESS NOTES
Hospitalist Progress Note           2020  12:44 PM                       Patient:  Emery Mccarty  PCP:  Shona Covarrubias MD  Date of admission:  2020    39 yo female with PMHx of DM, Cushing's admitted for rt foot redness, swelling and drainage. Pt states that she works in [address] and noticed ache in her Rt foot and when looked she noticed security sensor tag bottom of her shoes. Pt was seen at [facility] and given clindamycin but did not improve and decided to come to [facility].      Assessment & Plan  Rt Foot Cellulitis due to foreign body puncture   - Xray without obvious signs of Osteomyelitis  - on Doxy/Cefepime and Flagyl   - Podiatry con: possible need for debridement?  - no active drainage noted    DM type 2   - c/w lantus and sliding scale   - holding metformin   - A1c 6.9    Cushing's  - c/w home meds    VTE prophylaxis: Lovenox   Follow-up labs/studies: Cultures, Labs   Discussed plan of care with Patient/Family and Nurse   Disposition:  Anticipate discharge to HOME      Subjective  Pt seen and examined  Some ache in rt foot   No fever, chills      Review of systems otherwise as above     Physical examination  Visit Vitals  /75 (BP 1 Location: Left arm, BP Patient Position: At rest)   Pulse 80   Temp 97.9 °F (36.6 °C)   Resp 18   Ht 6' 1\" (1.854 m)   Wt 124.7 kg (275 lb)   SpO2 97%   BMI 36.28 kg/m²      Temp (24hrs), Av.5 °F (36.9 °C), Min:97.9 °F (36.6 °C), Max:99.5 °F (37.5 °C)         O2 Device: Room air  Visit Vitals  /75 (BP 1 Location: Left arm, BP Patient Position: At rest)   Pulse 80   Temp 97.9 °F (36.6 °C)   Resp 18   Ht 6' 1\" (1.854 m)   Wt 124.7 kg (275 lb)   SpO2 97%   BMI 36.28 kg/m²    O2 Device: Room air  Visit Vitals  /75 (BP 1 Location: Left arm, BP Patient Position: At rest)   Pulse 80   Temp 97.9 °F (36.6 °C)   Resp 18   Ht 6' 1\" (1.854 m)   Wt 124.7 kg (275 lb)   SpO2 97%   BMI 36.28 kg/m²       No intake or output data in the 24 hours ending 02/05/20 1244  Last shift:    No intake/output data recorded. Last 3 shifts:    No intake/output data recorded.     General:   Alert, cooperative, no acute distress   Head:   No obvious abnormalities, atraumatic   Eyes:   Conjunctivae clear   Oropharynx:  Oral mucosa normal   Neck:  Supple, trachea midline, no adenopathy   No JVD   Back:    No CVA tenderness    Chest wall:    No tenderness or deformities    Lungs:   Clear to auscultation bilaterally    Heart:   Regular rhythm, no murmur   Abdomen:    Soft, non-tender    Bowel sounds normal    No masses or organomegaly    Extremities:  Rt foot: edema, erythematous, warm, small entry wound on plantar, no drainage,    Pulses:  Symmetric all extremities   Skin:  Warm and dry    No rashes or lesions   Neurologic:  Oriented x3   No focal deficits   Urinary catheter:  deferred     Data review      Recent Labs     02/05/20 0047 02/04/20  1739   WBC 12.8* 15.7*   HGB 11.8 12.9   HCT 35.3 39.5    306     Recent Labs     02/05/20  0047 02/04/20  1739   * 136   K 3.9 4.7    105   CO2 23 22   * 114*   BUN 12 15   CREA 0.68 0.81   CA 8.1* 8.8   MG 1.8  --    PHOS 2.5*  --    ALB 2.6* 3.0*   TBILI 0.7 0.7   SGOT 7* 22   ALT 9* 11*     Current Facility-Administered Medications   Medication Dose Route Frequency    miFEPRIStone tab 300 mg (Patient Supplied)  300 mg Oral DAILY    cefepime (MAXIPIME) 2 g in 0.9% sodium chloride (MBP/ADV) 100 mL  2 g IntraVENous Q8H    metroNIDAZOLE (FLAGYL) IVPB premix 500 mg  500 mg IntraVENous Q12H    sodium chloride (NS) flush 5-40 mL  5-40 mL IntraVENous Q8H    sodium chloride (NS) flush 5-40 mL  5-40 mL IntraVENous PRN    acetaminophen (TYLENOL) tablet 650 mg  650 mg Oral Q6H PRN    naloxone (NARCAN) injection 0.4 mg  0.4 mg IntraVENous PRN    insulin lispro (HUMALOG) injection   SubCUTAneous QID WITH MEALS    glucose chewable tablet 16 g  4 Tab Oral PRN    glucagon (GLUCAGEN) injection 1 mg  1 mg IntraMUSCular PRN    dextrose 10% infusion 0-250 mL  0-250 mL IntraVENous PRN    enoxaparin (LOVENOX) injection 40 mg  40 mg SubCUTAneous Q24H    diph,Pertuss(AC),Tet Vac-PF (BOOSTRIX) suspension 0.5 mL  0.5 mL IntraMUSCular PRIOR TO DISCHARGE    doxycycline (VIBRAMYCIN) 100 mg in 0.9% sodium chloride (MBP/ADV) 100 mL  100 mg IntraVENous Q12H    spironolactone (ALDACTONE) tablet 50 mg  50 mg Oral DAILY    [START ON 2/6/2020] buPROPion SR (WELLBUTRIN SR) tablet 150 mg  150 mg Oral DAILY    insulin glargine (LANTUS) injection 25 Units  25 Units SubCUTAneous DAILY    influenza vaccine 2019-20 (6 mos+)(PF) (FLUARIX/FLULAVAL/FLUZONE QUAD) injection 0.5 mL  0.5 mL IntraMUSCular PRIOR TO DISCHARGE     Total time spent managing care of the patient: 35 minutes.        Jen Meyers MD   --Hospitalist, Internal Medicine

## 2020-02-05 NOTE — PROGRESS NOTES
[facility]: MED RECONCILIATION    Comments/Recommendations:   Patient able to confirm name, , allergies, and preferred pharmacy  On Mifepristone (Korlym) 300mg PO daily and Semaglutide (Ozempic) 1mg SQ Q7days, both non-formulary meds; Patient aware family will have to bring in if MD orders. Vancomycin allergy listed with reaction = rash; Patient stated she has experienced SOB and swelling - updated allergy reaction. Medications added:     Clindamycin 300mg PO 4x/day; Started 10 day course  for foot infection     Medications removed:    Benicar HCT 40-12.5 - 1 tab PO daily  Pantoprazole 40mg PO daily    Medications adjusted:    Insulin aspart 8-15 units TIDAC; Change from 8 units TIDAC as previously reported. Metformin 1000mg PO daily; Change from 500mg PO daily as previously reported. Spironolactone 50mg PO daily; Change from 50mg PO BID as previously reported. Information obtained from: Patient, Rx Query     Allergies: Vancomycin    Prior to Admission Medications:   Prior to Admission Medications   Prescriptions Last Dose Informant Taking? buPROPion SR (WELLBUTRIN SR) 150 mg SR tablet 2020 at am  Yes   Sig: TAKE 1 TABLET BY MOUTH EVERY DAY   clindamycin (CLEOCIN) 300 mg capsule 2020 at 1200  Yes   Sig: Take 300 mg by mouth four (4) times daily. drospirenone-ethinyl estradiol (RITA, 28,) 3-0.02 mg tab 2020 at am  Yes   Sig: Once a day   insulin aspart U-100 (NOVOLOG) 100 unit/mL injection   Yes   Si-15 Units by SubCUTAneous route Before breakfast, lunch, and dinner. insulin degludec (TRESIBA FLEXTOUCH U-100) 100 unit/mL (3 mL) inpn 2/3/2020 at pm  Yes   Sig: Increase INJECT 50 UNITS SUBCUTANEOUS ROUTE AT BEDTIME.   metFORMIN (GLUCOPHAGE) 500 mg tablet 2020 at am  Yes   Sig: Take 1,000 mg by mouth daily (with breakfast).    miFEPRIStone (KORLYM) 300 mg tab 2020 at am  Yes   Sig: Once a day   semaglutide (OZEMPIC) 1 mg/dose (2 mg/1.5 mL) sub-q pen 2020  Yes   Si mg by SubCUTAneous route every seven (7) days. spironolactone (ALDACTONE) 50 mg tablet 2/4/2020 at am  Yes   Sig: Take 50 mg by mouth daily. Facility-Administered Medications: None             Mohan Addison. FRANCISCO   [address]

## 2020-02-05 NOTE — ED NOTES
TRANSFER - OUT REPORT:    Verbal report given to molly tavares(name) on [facility] [place]  being transferred to 5th floor(unit) for routine progression of care       Report consisted of patients Situation, Background, Assessment and   Recommendations(SBAR). Information from the following report(s) SBAR, ED Summary and MAR was reviewed with the receiving nurse. Lines:   Peripheral IV 02/04/20 Right Antecubital (Active)   Site Assessment Clean, dry, & intact 2/4/2020  8:11 PM   Phlebitis Assessment 0 2/4/2020  8:11 PM   Infiltration Assessment 0 2/4/2020  8:11 PM   Dressing Status Clean, dry, & intact 2/4/2020  8:11 PM   Dressing Type Transparent 2/4/2020  8:11 PM   Hub Color/Line Status Pink 2/4/2020  8:11 PM       Peripheral IV 02/04/20 Left Antecubital (Active)        Opportunity for questions and clarification was provided.       Patient transported with:   Registered Nurse

## 2020-02-06 LAB
ANION GAP SERPL CALC-SCNC: 6 MMOL/L (ref 5–15)
BUN SERPL-MCNC: 8 MG/DL (ref 6–20)
BUN/CREAT SERPL: 11 (ref 12–20)
CALCIUM SERPL-MCNC: 7.7 MG/DL (ref 8.5–10.1)
CHLORIDE SERPL-SCNC: 108 MMOL/L (ref 97–108)
CO2 SERPL-SCNC: 25 MMOL/L (ref 21–32)
CREAT SERPL-MCNC: 0.71 MG/DL (ref 0.55–1.02)
ERYTHROCYTE [DISTWIDTH] IN BLOOD BY AUTOMATED COUNT: 12.2 % (ref 11.5–14.5)
GLUCOSE BLD STRIP.AUTO-MCNC: 142 MG/DL (ref 65–100)
GLUCOSE BLD STRIP.AUTO-MCNC: 156 MG/DL (ref 65–100)
GLUCOSE BLD STRIP.AUTO-MCNC: 175 MG/DL (ref 65–100)
GLUCOSE BLD STRIP.AUTO-MCNC: 193 MG/DL (ref 65–100)
GLUCOSE SERPL-MCNC: 198 MG/DL (ref 65–100)
HCT VFR BLD AUTO: 35.9 % (ref 35–47)
HGB BLD-MCNC: 11.8 G/DL (ref 11.5–16)
MCH RBC QN AUTO: 30.8 PG (ref 26–34)
MCHC RBC AUTO-ENTMCNC: 32.9 G/DL (ref 30–36.5)
MCV RBC AUTO: 93.7 FL (ref 80–99)
NRBC # BLD: 0 K/UL (ref 0–0.01)
NRBC BLD-RTO: 0 PER 100 WBC
PLATELET # BLD AUTO: 276 K/UL (ref 150–400)
PMV BLD AUTO: 10.4 FL (ref 8.9–12.9)
POTASSIUM SERPL-SCNC: 4.1 MMOL/L (ref 3.5–5.1)
RBC # BLD AUTO: 3.83 M/UL (ref 3.8–5.2)
SERVICE CMNT-IMP: ABNORMAL
SODIUM SERPL-SCNC: 139 MMOL/L (ref 136–145)
WBC # BLD AUTO: 10.2 K/UL (ref 3.6–11)

## 2020-02-06 PROCEDURE — 80048 BASIC METABOLIC PNL TOTAL CA: CPT

## 2020-02-06 PROCEDURE — 74011000258 HC RX REV CODE- 258: Performed by: INTERNAL MEDICINE

## 2020-02-06 PROCEDURE — 74011636637 HC RX REV CODE- 636/637: Performed by: INTERNAL MEDICINE

## 2020-02-06 PROCEDURE — 82962 GLUCOSE BLOOD TEST: CPT

## 2020-02-06 PROCEDURE — 85027 COMPLETE CBC AUTOMATED: CPT

## 2020-02-06 PROCEDURE — 90686 IIV4 VACC NO PRSV 0.5 ML IM: CPT | Performed by: INTERNAL MEDICINE

## 2020-02-06 PROCEDURE — 90471 IMMUNIZATION ADMIN: CPT

## 2020-02-06 PROCEDURE — 74011250636 HC RX REV CODE- 250/636: Performed by: INTERNAL MEDICINE

## 2020-02-06 PROCEDURE — 65270000029 HC RM PRIVATE

## 2020-02-06 PROCEDURE — 36415 COLL VENOUS BLD VENIPUNCTURE: CPT

## 2020-02-06 PROCEDURE — 74011250637 HC RX REV CODE- 250/637: Performed by: INTERNAL MEDICINE

## 2020-02-06 RX ADMIN — INFLUENZA VIRUS VACCINE 0.5 ML: 15; 15; 15; 15 SUSPENSION INTRAMUSCULAR at 13:22

## 2020-02-06 RX ADMIN — INSULIN LISPRO 2 UNITS: 100 INJECTION, SOLUTION INTRAVENOUS; SUBCUTANEOUS at 11:50

## 2020-02-06 RX ADMIN — CEFEPIME 2 G: 20 INJECTION, POWDER, FOR SOLUTION INTRAVENOUS at 13:14

## 2020-02-06 RX ADMIN — Medication 10 ML: at 14:00

## 2020-02-06 RX ADMIN — DOXYCYCLINE 100 MG: 100 INJECTION, POWDER, LYOPHILIZED, FOR SOLUTION INTRAVENOUS at 21:15

## 2020-02-06 RX ADMIN — METRONIDAZOLE 500 MG: 500 INJECTION, SOLUTION INTRAVENOUS at 09:46

## 2020-02-06 RX ADMIN — ACETAMINOPHEN 650 MG: 325 TABLET ORAL at 16:26

## 2020-02-06 RX ADMIN — ENOXAPARIN SODIUM 40 MG: 40 INJECTION SUBCUTANEOUS at 08:26

## 2020-02-06 RX ADMIN — INSULIN LISPRO 2 UNITS: 100 INJECTION, SOLUTION INTRAVENOUS; SUBCUTANEOUS at 08:14

## 2020-02-06 RX ADMIN — DOXYCYCLINE 100 MG: 100 INJECTION, POWDER, LYOPHILIZED, FOR SOLUTION INTRAVENOUS at 08:19

## 2020-02-06 RX ADMIN — SPIRONOLACTONE 50 MG: 25 TABLET ORAL at 09:43

## 2020-02-06 RX ADMIN — METRONIDAZOLE 500 MG: 500 INJECTION, SOLUTION INTRAVENOUS at 22:37

## 2020-02-06 RX ADMIN — CEFEPIME 2 G: 20 INJECTION, POWDER, FOR SOLUTION INTRAVENOUS at 05:10

## 2020-02-06 RX ADMIN — BUPROPION HYDROCHLORIDE 150 MG: 150 TABLET, EXTENDED RELEASE ORAL at 09:45

## 2020-02-06 RX ADMIN — Medication 10 ML: at 05:11

## 2020-02-06 RX ADMIN — INSULIN LISPRO 2 UNITS: 100 INJECTION, SOLUTION INTRAVENOUS; SUBCUTANEOUS at 17:58

## 2020-02-06 RX ADMIN — INSULIN GLARGINE 25 UNITS: 100 INJECTION, SOLUTION SUBCUTANEOUS at 08:28

## 2020-02-06 NOTE — PROGRESS NOTES
Hospitalist Progress Note           2020  12:44 PM                       Patient:  Ester Davis  PCP:  Dang Hinojosa MD  Date of admission:  2020    37 yo female with PMHx of DM, Cushing's admitted for rt foot redness, swelling and drainage. Pt states that she works in [address] and noticed ache in her Rt foot and when looked she noticed security sensor tag bottom of her shoes. Pt was seen at [facility] and given clindamycin but did not improve and decided to come to [facility].      Assessment & Plan  Rt Foot Cellulitis due to foreign body puncture   - Xray without obvious signs of Osteomyelitis  - on Doxy/Cefepime and Flagyl  - Podiatry following, s/p excisional debridement   - Follow wound culture  - no active drainage noted    DM type 2   - c/w lantus and sliding scale   - holding metformin   - A1c 6.9     Cushing's  - c/w home meds    VTE prophylaxis: Lovenox   Follow-up labs/studies: Cultures, Labs   Discussed plan of care with Patient/Family and Nurse   Disposition:  Anticipate discharge to HOME      Subjective  Pt seen and examined  Some ache in rt foot   Pain when walking on foot        Review of systems otherwise as above     Physical examination  Visit Vitals  /74 (BP 1 Location: Left arm, BP Patient Position: At rest)   Pulse 85   Temp 98.3 °F (36.8 °C)   Resp 16   Ht 6' 1\" (1.854 m)   Wt 124.7 kg (275 lb)   SpO2 98%   BMI 36.28 kg/m²      Temp (24hrs), Av.1 °F (36.7 °C), Min:97.8 °F (36.6 °C), Max:98.5 °F (36.9 °C)         O2 Device: Room air  Visit Vitals  /74 (BP 1 Location: Left arm, BP Patient Position: At rest)   Pulse 85   Temp 98.3 °F (36.8 °C)   Resp 16   Ht 6' 1\" (1.854 m)   Wt 124.7 kg (275 lb)   SpO2 98%   BMI 36.28 kg/m²    O2 Device: Room air  Visit Vitals  /74 (BP 1 Location: Left arm, BP Patient Position: At rest)   Pulse 85   Temp 98.3 °F (36.8 °C)   Resp 16   Ht 6' 1\" (1.854 m)   Wt 124.7 kg (275 lb)   SpO2 98%   BMI 36.28 kg/m²         Intake/Output Summary (Last 24 hours) at 2/6/2020 1301  Last data filed at 2/6/2020 1289  Gross per 24 hour   Intake 1318 ml   Output    Net 1318 ml     Last shift:    02/06 0701 - 02/06 1900  In: 598 [P.O.:598]  Out: -   Last 3 shifts:    02/04 1901 - 02/06 0700  In: 720 [P.O.:720]  Out: -     General:   Alert, cooperative, no acute distress   Head:   No obvious abnormalities, atraumatic   Eyes:   Conjunctivae clear   Oropharynx:  Oral mucosa normal   Neck:  Supple, trachea midline, no adenopathy   No JVD   Back:    No CVA tenderness    Chest wall:    No tenderness or deformities    Lungs:   Clear to auscultation bilaterally    Heart:   Regular rhythm, no murmur   Abdomen:    Soft, non-tender    Bowel sounds normal    No masses or organomegaly    Extremities:  Rt foot: wrapped,  plantar, no drainage,    Pulses:  Symmetric all extremities   Skin:  Warm and dry    No rashes or lesions   Neurologic:  Oriented x3   No focal deficits   Urinary catheter:  deferred     Data review      Recent Labs     02/06/20  0515 02/05/20  0047 02/04/20  1739   WBC 10.2 12.8* 15.7*   HGB 11.8 11.8 12.9   HCT 35.9 35.3 39.5    278 306     Recent Labs     02/06/20  0008 02/05/20  0047 02/04/20  1739    135* 136   K 4.1 3.9 4.7    105 105   CO2 25 23 22   * 181* 114*   BUN 8 12 15   CREA 0.71 0.68 0.81   CA 7.7* 8.1* 8.8   MG  --  1.8  --    PHOS  --  2.5*  --    ALB  --  2.6* 3.0*   TBILI  --  0.7 0.7   SGOT  --  7* 22   ALT  --  9* 11*     Current Facility-Administered Medications   Medication Dose Route Frequency    miFEPRIStone tab 300 mg (Patient Supplied)  300 mg Oral DAILY    cefepime (MAXIPIME) 2 g in 0.9% sodium chloride (MBP/ADV) 100 mL  2 g IntraVENous Q8H    metroNIDAZOLE (FLAGYL) IVPB premix 500 mg  500 mg IntraVENous Q12H    sodium chloride (NS) flush 5-40 mL  5-40 mL IntraVENous Q8H    sodium chloride (NS) flush 5-40 mL  5-40 mL IntraVENous PRN    acetaminophen (TYLENOL) tablet 650 mg  650 mg Oral Q6H PRN    naloxone (NARCAN) injection 0.4 mg  0.4 mg IntraVENous PRN    insulin lispro (HUMALOG) injection   SubCUTAneous QID WITH MEALS    glucose chewable tablet 16 g  4 Tab Oral PRN    glucagon (GLUCAGEN) injection 1 mg  1 mg IntraMUSCular PRN    dextrose 10% infusion 0-250 mL  0-250 mL IntraVENous PRN    enoxaparin (LOVENOX) injection 40 mg  40 mg SubCUTAneous Q24H    diph,Pertuss(AC),Tet Vac-PF (BOOSTRIX) suspension 0.5 mL  0.5 mL IntraMUSCular PRIOR TO DISCHARGE    doxycycline (VIBRAMYCIN) 100 mg in 0.9% sodium chloride (MBP/ADV) 100 mL  100 mg IntraVENous Q12H    spironolactone (ALDACTONE) tablet 50 mg  50 mg Oral DAILY    buPROPion SR (WELLBUTRIN SR) tablet 150 mg  150 mg Oral DAILY    insulin glargine (LANTUS) injection 25 Units  25 Units SubCUTAneous DAILY    influenza vaccine 2019-20 (6 mos+)(PF) (FLUARIX/FLULAVAL/FLUZONE QUAD) injection 0.5 mL  0.5 mL IntraMUSCular PRIOR TO DISCHARGE     Total time spent managing care of the patient: 30 minutes.        Lakeisha Vazquez MD   --Hospitalist, Internal Medicine

## 2020-02-06 NOTE — CONSULTS
The [address] [place] [address] Podiatric Surgery  H & P Note    Date: February 5, 2020  Patient: Daysi Hummel  MR #: [id number]  [facility] #: [id number]  Attending/Admitting Physician: Dr Kelly Trevino MD    Reason for Consult:  Dr Kelly Trevino MD asked me to see Daysi Hummel for evaluation and treatment of right foot puncture wound and cellulitis    History of Present Illness:  Patient is a 38 y.o. female admitted for infection of right foot. She recently stepped on a needle from a security sensor tag through her shoe. She was admitted and started on antibiotics. Notes previous fracture which led to charcot foot breakdown. ROS:   Constitutional: Negative for fever, chills, weight loss and malaise/fatigue. HENT: Negative for nosebleeds and congestion. Eyes: Negative for blurred vision and double vision. Respiratory: Negative for cough, shortness of breath and wheezing. Cardiovascular: Negative for chest pain, palpitations, claudication. Positive for leg swelling  Gastrointestinal: Negative for heartburn, nausea, vomiting, abdominal pain and diarrhea. Genitourinary: Negative for dysuria and urgency. Musculoskeletal: positive for leg swelling  Skin: ulcer right foot, swelling and redness  Neurological: Negative for dizziness, focal weakness and headaches. Positive for numbness  Endo/Heme/Allergies: Negative for environmental allergies. Does not bruise/bleed easily. Psychiatric/Behavioral: Negative for depression and suicidal ideas. The patient is not nervous/anxious. Blood pressure (P) 135/81, pulse (P) 89, temperature (P) 97.9 °F (36.6 °C), resp. rate (P) 18, height 6' 1\" (1.854 m), weight 124.7 kg (275 lb), SpO2 (P) 98 %.   No intake or output data in the 24 hours ending 02/05/20 1012      Medical History:  Past Medical History:   Diagnosis Date    Broken foot 11/11    DM [facility]diabetes mellitus) ([facility] [address]     CGGBPRTG(086.1)      Past Surgical History:   Procedure Laterality Date    HX CHOLECYSTECTOMY      HX OTHER SURGICAL  10/2016    gastric sleeve     HX OVARIAN CYST REMOVAL      HX TONSILLECTOMY       [unfilled]  Allergies   Allergen Reactions    Vancomycin Anaphylaxis and Rash     Family History   Problem Relation Age of Onset    Diabetes Mother     Hypertension Mother     Diabetes Father     Hypertension Father     Heart Disease Father     Breast Cancer Maternal Grandmother      Social History     Tobacco Use   Smoking Status Never Smoker   Smokeless Tobacco Never Used     Social History     Substance and Sexual Activity   Drug Use No     Social History     Substance and Sexual Activity   Alcohol Use Yes    Comment: socially       Labs:  Lab Results   Component Value Date/Time    WBC 12.8 (H) 02/05/2020 12:47 AM    HGB 11.8 02/05/2020 12:47 AM    HCT 35.3 02/05/2020 12:47 AM    MCV 93.1 02/05/2020 12:47 AM    PLATELET 264 74/19/3019 12:47 AM     Lab Results   Component Value Date/Time    Sodium 135 (L) 02/05/2020 12:47 AM    Potassium 3.9 02/05/2020 12:47 AM    Chloride 105 02/05/2020 12:47 AM    CO2 23 02/05/2020 12:47 AM    Phosphorus 2.5 (L) 02/05/2020 12:47 AM    BUN 12 02/05/2020 12:47 AM    Calcium 8.1 (L) 02/05/2020 12:47 AM      Lab Results   Component Value Date/Time    Glucose 181 (H) 02/05/2020 12:47 AM     No results found for: INR, INREXT No components found for: PTT  Recent Labs     02/05/20  0047 02/04/20  1739   * 114*     Physical Exam:  General appearance: alert, cooperative, no distress, appears stated age    Lower Extremity Exam:  Vascular:    Dorsalis Pedis Pulse: present  Posterior Tibialis Pulse: present  Popliteal and Femoral Pulses: present  Skin Temp: warm to warm legs to toes  Extremity Edema: nonpitting edema right and left foot  Varicosities: present right and left    Neurological:  Deep Tendon Reflexes of Achilles and Patellar: intact right and left foot  Epicritic and Protective Sensations: absent right and left lower extremity  Deep Pain Response: diminished right and left    Dermatological:  Toenails: mycotic toes 1-5 right and left foot  Ulceration:noted under the right 3rd metatarsal head  Measures 0.3cm x 0.3cm x 1cm pre-debridement, 0.4cm x 0.4cm x 1.2cm post debridement    Erythema: noted to the right foot around the wound and at the base of the toes    Musculoskeletal:  Gait and Station: antalgic  Muscle Strength of Major Muscle Groups: 4/5 right and left lower extremities  Stability: diminished right and left  Range of Motion: diminished ankle range of motion right and left  Limb Deformities: severe cavovarus deformity right and left. Right worse than left. Hammertoe contractures toes 1-5. Hallux valgus right and left. Imaging Modalities:   EXAM: XR FOOT RT MIN 3 V  History: Cellulitis  INDICATION: cellulitis.     COMPARISON: None.     FINDINGS: Three views of the right foot demonstrate chronic osseous fusion at  the base of the fifth metatarsal. There is soft tissue edema demonstrated.     IMPRESSION  IMPRESSION: No acute fracture or dislocation. Microbiological: culture pending    Impression:  1. Puncture wound through a shoe, right foot  2. Cellulitis right foot  3. Ulceration right foot  4. Diabetes with Neuropathy  5. 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 Injury is puncture wound through a shoe so cefepime should cover pseudomonas infection. No abscess noted but wound is deep. Continue antibiotics, cultures pending. No evidence of osteomyelitis. Dry dressing applied. Will need offloading with total contact cast as outpatient. Once cellulitis starts to recede, then ok to discharge on po antibiotics. The nature of the finding, probable diagnosis and likely treatment was thoroughly discussed with the patient.  The options, risks, complications, alternative treatment as well as some of the differential diagnosis was discussed. The patient was thoroughly informed and all questions were answered. the patient indicated understanding and satisfaction with the discussion.     - Procedure:Patient has an open ulceration and complicating systemic disease. I have recommended excisional debridement of the ulcer. Patient apprised of the procedure and informed consent obtained. Excisional debridement into and including subcutaneous tissue via 15 blade with removal of devitalized tissue of said level. Patient tolerated procedure well due to neuropathy. Hemostasis achieved with direct pressure. - Minimal walking. Will need darco forefoot offloading shoe. Will follow. Thanks for calling. Hadley Alejandro.  MIESHA Mason FACFAS FACCWS [facility]  Triple Board Certified Foot & Ankle Specialist  [phone] cell  2/5/2020  10:12 AM

## 2020-02-06 NOTE — PROGRESS NOTES
2-6-2020 CASE MANAGEMENT NOTE:  I met with the patient to introduce myself and explain the role of case management. She confirmed that her adult nephew, Neelima Chen ([phone]), lives with her in her 2-story house. She is independent with her ADL's, uses no assistive devices, works a full-time job and also a part-time job and drives (drove self here). CM will follow and assist with discharge needs as indicated.  Rose Latham, ALBANW, CM

## 2020-02-06 NOTE — PROGRESS NOTES
Bedside shift change report given to [address] [place] [address], [address], RN (oncoming nurse) by July Barcenas (offgoing nurse). Report included the following information SBAR and Kardex.

## 2020-02-07 VITALS
BODY MASS INDEX: 37.25 KG/M2 | DIASTOLIC BLOOD PRESSURE: 72 MMHG | HEIGHT: 72 IN | TEMPERATURE: 98.3 F | OXYGEN SATURATION: 97 % | RESPIRATION RATE: 16 BRPM | WEIGHT: 275 LBS | SYSTOLIC BLOOD PRESSURE: 136 MMHG | HEART RATE: 80 BPM

## 2020-02-07 LAB
BACTERIA SPEC CULT: ABNORMAL
COMMENT, HOLDF: NORMAL
GLUCOSE BLD STRIP.AUTO-MCNC: 144 MG/DL (ref 65–100)
GLUCOSE BLD STRIP.AUTO-MCNC: 164 MG/DL (ref 65–100)
GRAM STN SPEC: ABNORMAL
GRAM STN SPEC: ABNORMAL
SAMPLES BEING HELD,HOLD: NORMAL
SERVICE CMNT-IMP: ABNORMAL

## 2020-02-07 PROCEDURE — 74011250636 HC RX REV CODE- 250/636: Performed by: INTERNAL MEDICINE

## 2020-02-07 PROCEDURE — 36415 COLL VENOUS BLD VENIPUNCTURE: CPT

## 2020-02-07 PROCEDURE — 77030036666 HC SHOE HEALING ORTHOWDG PATT -B

## 2020-02-07 PROCEDURE — 77030018836 HC SOL IRR NACL ICUM -A

## 2020-02-07 PROCEDURE — 74011000258 HC RX REV CODE- 258: Performed by: INTERNAL MEDICINE

## 2020-02-07 PROCEDURE — 82962 GLUCOSE BLOOD TEST: CPT

## 2020-02-07 PROCEDURE — 74011250637 HC RX REV CODE- 250/637: Performed by: INTERNAL MEDICINE

## 2020-02-07 PROCEDURE — 74011636637 HC RX REV CODE- 636/637: Performed by: INTERNAL MEDICINE

## 2020-02-07 RX ORDER — AMOXICILLIN AND CLAVULANATE POTASSIUM 875; 125 MG/1; MG/1
1 TABLET, FILM COATED ORAL 2 TIMES DAILY
Qty: 14 TAB | Refills: 0 | Status: SHIPPED | OUTPATIENT
Start: 2020-02-07 | End: 2020-03-13 | Stop reason: ALTCHOICE

## 2020-02-07 RX ADMIN — CEFEPIME 2 G: 20 INJECTION, POWDER, FOR SOLUTION INTRAVENOUS at 00:12

## 2020-02-07 RX ADMIN — SPIRONOLACTONE 50 MG: 25 TABLET ORAL at 09:31

## 2020-02-07 RX ADMIN — Medication 10 ML: at 00:12

## 2020-02-07 RX ADMIN — METRONIDAZOLE 500 MG: 500 INJECTION, SOLUTION INTRAVENOUS at 11:36

## 2020-02-07 RX ADMIN — INSULIN LISPRO 2 UNITS: 100 INJECTION, SOLUTION INTRAVENOUS; SUBCUTANEOUS at 12:07

## 2020-02-07 RX ADMIN — CEFEPIME 2 G: 20 INJECTION, POWDER, FOR SOLUTION INTRAVENOUS at 09:14

## 2020-02-07 RX ADMIN — INSULIN LISPRO 2 UNITS: 100 INJECTION, SOLUTION INTRAVENOUS; SUBCUTANEOUS at 09:23

## 2020-02-07 RX ADMIN — BUPROPION HYDROCHLORIDE 150 MG: 150 TABLET, EXTENDED RELEASE ORAL at 09:31

## 2020-02-07 RX ADMIN — Medication 10 ML: at 13:25

## 2020-02-07 RX ADMIN — ENOXAPARIN SODIUM 40 MG: 40 INJECTION SUBCUTANEOUS at 09:27

## 2020-02-07 RX ADMIN — DOXYCYCLINE 100 MG: 100 INJECTION, POWDER, LYOPHILIZED, FOR SOLUTION INTRAVENOUS at 10:23

## 2020-02-07 RX ADMIN — Medication 10 ML: at 05:25

## 2020-02-07 RX ADMIN — INSULIN GLARGINE 25 UNITS: 100 INJECTION, SOLUTION SUBCUTANEOUS at 09:26

## 2020-02-07 NOTE — DISCHARGE SUMMARY
Discharge Summary     Patient:  Garcia Calvo       MRN: [id number]       YOB: 1975       Age: 38 y.o. Date of admission:  2/4/2020    Date of discharge:  2/7/2020    Primary care provider: Dr. Martín Schneider MD    Admitting provider:  Bladimir Elliott MD    Discharging provider:  Sultana Wong MD - [phone]  If unavailable, call [phone] and ask the  to page the triage hospitalist.    Consultations  · IP CONSULT TO PODIATRY    Procedures  · * No surgery found *    Discharge destination: HOME. The patient is stable for discharge. Admission diagnosis  · Cellulitis [L03.90]    Current Discharge Medication List      START taking these medications    Details   amoxicillin-clavulanate (AUGMENTIN) 875-125 mg per tablet Take 1 Tab by mouth two (2) times a day. Qty: 14 Tab, Refills: 0         CONTINUE these medications which have NOT CHANGED    Details   insulin aspart U-100 (NOVOLOG) 100 unit/mL injection 8-15 Units by SubCUTAneous route Before breakfast, lunch, and dinner. metFORMIN (GLUCOPHAGE) 500 mg tablet Take 1,000 mg by mouth daily (with breakfast). spironolactone (ALDACTONE) 50 mg tablet Take 50 mg by mouth daily. miFEPRIStone (KORLYM) 300 mg tab Once a day  Qty: 30 Tab, Refills: 3      semaglutide (OZEMPIC) 1 mg/dose (2 mg/1.5 mL) sub-q pen 1 mg by SubCUTAneous route every seven (7) days. Qty: 1 Box, Refills: 6      drospirenone-ethinyl estradiol (RITA, 28,) 3-0.02 mg tab Once a day  Qty: 1 Package, Refills: 5      buPROPion SR (WELLBUTRIN SR) 150 mg SR tablet TAKE 1 TABLET BY MOUTH EVERY DAY  Qty: 90 Tab, Refills: 4      insulin degludec (TRESIBA FLEXTOUCH U-100) 100 unit/mL (3 mL) inpn Increase INJECT 50 UNITS SUBCUTANEOUS ROUTE AT BEDTIME.   Qty: 45 mL, Refills: 4    Associated Diagnoses: Type 2 diabetes mellitus with hyperglycemia, without long-term current use of insulin (HCC)         STOP taking these medications       clindamycin (CLEOCIN) 300 mg capsule Comments:   Reason for Stopping: Follow-up Information     Follow up With Specialties Details Why Contact Info    Pt own medication- Mifepristone   Please return to patient at discharge     Haider Jimenez MD Family Practice In 1 week Discharge follow up   47-7  [phone]      Celia Mason, MIESHA Podiatry  Wound follow up  [address] [facility]  [phone]            Final discharge diagnoses and brief hospital course  Please also refer to the admission H&P for details on the presenting problem. 37 yo female with PMHx of DM, Cushing's admitted for rt foot redness, swelling and drainage. Pt states that she works in [address] [employer] and noticed ache in her Rt foot and when looked she noticed security sensor tag bottom of her shoes. Pt was seen at [facility] and given clindamycin but did not improve and decided to come to [facility].      Rt Foot Cellulitis due to foreign body puncture   - Xray without obvious signs of Osteomyelitis  - on Doxy/Cefepime and Flagyl  - Podiatry following, s/p excisional debridement   - Follow wound culture: MSSA  - Change antibiotics to Augmentin BID X 7 days to complete course  - Wound care  - Outpatient Podiatry/Wound Care follow up     DM type 2   - c/w Tresiba, Metformin, Ozempic   - A1c 6.9      Cushing's  - c/w home meds    FOLLOW-UP TESTS recommended: NONE     PENDING TEST RESULTS:  At the time of your discharge the following test results are still pending: none  Please make sure you review these results with your outpatient follow-up provider(s).     SYMPTOMS to watch for: chest pain, shortness of breath, fever, chills, nausea, vomiting, diarrhea, change in mentation, falling, weakness, bleeding.     DIET/what to eat:  Diabetic Diet     ACTIVITY:  Activity as tolerated     WOUND CARE: Dry 4 X 4, gauze, wrap with kerlex and ace wrap     EQUIPMENT needed:  darco forefoot offloading shoe    Physical examination at discharge  Visit Vitals  /72 (BP 1 Location: Right arm, BP Patient Position: At rest)   Pulse 82   Temp 98.3 °F (36.8 °C)   Resp 16   Ht 6' 1\" (1.854 m)   Wt 124.7 kg (275 lb)   SpO2 97%   BMI 36.28 kg/m²     Ao3  PERRLA  Lung Clear  CVS: RRR  Abd: soft NT ND  Ext: Rt foot: minimal edema and erythema, much improved, no drainage    Pertinent imaging studies:    Results     Procedure Component Value Units Date/Time    CULTURE, Joanna Good STAIN [[id number]]  (Abnormal)  (Susceptibility) Collected:  02/05/20 1900    Order Status:  Completed Specimen:  Wound from Foot Updated:  02/07/20 1257     Special Requests: NO SPECIAL REQUESTS        GRAM STAIN RARE WBCS SEEN         NO ORGANISMS SEEN        Culture result:       LIGHT STAPHYLOCOCCUS AUREUS          Susceptibility      Staphylococcus aureus     ANDREWS     Ampicillin ($) Resistant     Ampicillin/sulbactam ($) Susceptible     Cefazolin ($) Susceptible     Ciprofloxacin ($) Susceptible     Clindamycin ($) Susceptible     Daptomycin ($$$$$) Susceptible     Erythromycin ($$$$) Susceptible     Gentamicin ($) Susceptible     Linezolid ($$$$$) Susceptible     Oxacillin Susceptible     Rifampin ($$$$) Susceptible [1]      Tetracycline Susceptible     Trimeth/Sulfa Susceptible     Vancomycin ($) Susceptible            [1]   Rifampin is not to be used for mono-therapy.                  CULTURE, BLOOD, PAIRED [[id number]] Collected:  02/04/20 2040    Order Status:  Completed Specimen:  Blood Updated:  02/07/20 0351     Special Requests: NO SPECIAL REQUESTS        Culture result: NO GROWTH 3 DAYS                 Recent Labs     02/06/20  0515 02/05/20  0047 02/04/20  1739   WBC 10.2 12.8* 15.7*   HGB 11.8 11.8 12.9   HCT 35.9 35.3 39.5    278 306     Recent Labs     02/06/20  0008 02/05/20  0047 02/04/20  1739    135* 136   K 4.1 3.9 4.7    105 105   CO2 25 23 22   BUN 8 12 15   CREA 0.71 0.68 0.81   * 181* 114*   CA 7.7* 8.1* 8.8   MG  --  1.8  --    PHOS  --  2.5*  --      Recent Labs     02/05/20  0047 02/04/20  1739   SGOT 7* 22   AP 52 57   TP 6.5 7.6   ALB 2.6* 3.0*   GLOB 3.9 4.6*     No results for input(s): INR, PTP, APTT, INREXT in the last 72 hours. No results for input(s): FE, TIBC, PSAT, FERR in the last 72 hours. No results for input(s): PH, PCO2, PO2 in the last 72 hours. No results for input(s): CPK, CKMB in the last 72 hours. No lab exists for component: TROPONINI  No components found for: [place]    Chronic Diagnoses:    Problem List as of 2/7/2020 Date Reviewed: 2/4/2020          Codes Class Noted - Resolved    Cellulitis ICD-10-CM: L03.90  ICD-9-CM: 682.9  2/4/2020 - Present        Cushing disease ([facility] ICD-10-CM: E24.0  ICD-9-CM: 255.0  2/4/2020 - Present        S/P bariatric surgery ICD-10-CM: Z98.84  ICD-9-CM: V45.86  4/16/2018 - Present        Encounter for long-term (current) use of insulin ([facility] ICD-10-CM: Z79.4  ICD-9-CM: V58.67  3/18/2016 - Present        Hypertriglyceridemia ICD-10-CM: E78.1  ICD-9-CM: 272.1  3/18/2016 - Present        Essential hypertension ICD-10-CM: I10  ICD-9-CM: 401.9  3/18/2016 - Present        BMI 45.0-49.9, adult ([facility] ICD-10-CM: X40.88  ICD-9-CM: V85.42  3/18/2016 - Present        DM (diabetes mellitus) ([facility] ICD-10-CM: E11.9  ICD-9-CM: 250.00  Unknown - Present        Headache(784.0) ICD-10-CM: R51  ICD-9-CM: 784.0  Unknown - Present              Time spent on discharge related activities today greater than 30 minutes.       Signed:  Meli Grimes MD                 Hospitalist, Internal Medicine      Cc: Sammie Isabel MD

## 2020-02-07 NOTE — PROGRESS NOTES
Bedside shift change report given to [facility] acute hospital (oncoming nurse) by Juanita Phan (offgoing nurse). Report included the following information SBAR, Kardex, Intake/Output, MAR, Recent Results, Alarm Parameters , Pre Procedure Checklist, Procedure Verification and Quality Measures.

## 2020-02-07 NOTE — PROGRESS NOTES
Bedside and Verbal shift change report given to Tamara Sawyer RN (oncoming nurse) by Marita Ruiz RN (offgoing nurse). Report included the following information SBAR, Intake/Output, MAR and Recent Results.

## 2020-02-07 NOTE — PROGRESS NOTES
2-7-2020 CASE MANAGEMENT NOTE:  Order for Darco Forefoot Offloading Shoe for Right Foot showed up under Case Management orders. This was gotten for the patient by PT and given to her. The patient is being discharged today and has no further needs.  ALBAN FullerW, CM

## 2020-02-07 NOTE — PROGRESS NOTES
Spiritual Care Partner Volunteer visited patient in 4 Post Surg on 2/7/20.   Documented by: Leida Crisostomo., MS., [address] [facility] [address] [place] ([id number])

## 2020-02-07 NOTE — DISCHARGE INSTRUCTIONS
Please bring this form with you to show your primary care provider at your follow-up appointment. Primary care provider:  Dr. Jairo Caal MD    Discharging provider:  Evon Trevino MD    You have been admitted to the hospital with the following diagnoses:  · Cellulitis [L03.90]    FOLLOW-UP CARE RECOMMENDATIONS:    APPOINTMENTS:  Follow-up Information     Follow up With Specialties Details Why Contact Info    Pt own medication- Mifepristone   Please return to patient at discharge     Jairo Caal MD Family Practice In 1 week Discharge follow up  [address]      Fany Mason DPM Podiatry  Wound follow up  [address] [facility]  [phone]              FOLLOW-UP TESTS recommended: NONE    PENDING TEST RESULTS:  At the time of your discharge the following test results are still pending: none  Please make sure you review these results with your outpatient follow-up provider(s). SYMPTOMS to watch for: chest pain, shortness of breath, fever, chills, nausea, vomiting, diarrhea, change in mentation, falling, weakness, bleeding. DIET/what to eat:  Diabetic Diet    ACTIVITY:  Activity as tolerated    WOUND CARE: Dry 4 X 4, gauze, wrap with kerlex and ace wrap    EQUIPMENT needed:  darco forefoot offloading shoe      What to do if new or unexpected symptoms occur? If you experience any of the above symptoms (or should other concerns or questions arise after discharge) please call your primary care physician. Return to the emergency room if you cannot get hold of your doctor. · It is very important that you keep your follow-up appointment(s). · Please bring discharge papers, medication list (and/or medication bottles) to your follow-up appointments for review by your outpatient provider(s).   · Please check the list of medications and be sure it includes every medication (even non-prescription medications) that your provider wants you to take. · It is important that you take the medication exactly as they are prescribed. · Keep your medication in the bottles provided by the pharmacist and keep a list of the medication names, dosages, and times to be taken in your wallet. · Do not take other medications without consulting your doctor. · If you have any questions about your medications or other instructions, please talk to your nurse or care provider before you leave the hospital.    I understand that if any problems occur once I am at home I am to contact my physician. These instructions were explained to me and I had the opportunity to ask questions.

## 2020-02-07 NOTE — PROGRESS NOTES
I have reviewed discharge instructions with the patient. The patient verbalized understanding. E-sign on WOW missing. Both RN and patient signed paper AVS.   Patient ready for discharge.

## 2020-02-09 LAB
BACTERIA SPEC CULT: NORMAL
SERVICE CMNT-IMP: NORMAL

## 2020-02-09 NOTE — PROGRESS NOTES
[address] [facility]           2/7/2020      RE: Shahzad Moscoso        To Whom It May Concern,    This is to certify that Steph Calderon has been under the care of [facility]  due to acute illness from 2/4/2020 until 2/7/2020 . Please excuse her from work until 2/9/2020    Shahzad Moscoso may return to work on/after 2/10/2020. Please feel free to contact my office ([phone]) if you have any questions or concerns. Thank you for your assistance in this matter.     Sincerely,            Evon Trevino M.D  Internal Medicine

## 2020-02-20 ENCOUNTER — DOCUMENTATION ONLY (OUTPATIENT)
Dept: ENDOCRINOLOGY | Age: 45
End: 2020-02-20

## 2020-02-20 DIAGNOSIS — E24.9 HYPERCORTISOLISM (HCC): Primary | ICD-10-CM

## 2020-02-20 RX ORDER — SPIRONOLACTONE 100 MG/1
100 TABLET, FILM COATED ORAL 2 TIMES DAILY
Qty: 180 TAB | Refills: 3 | Status: SHIPPED | OUTPATIENT
Start: 2020-02-20 | End: 2020-02-21 | Stop reason: SDUPTHER

## 2020-02-20 NOTE — PROGRESS NOTES
I heard from rep today that they shipped Nicolette Moyer at higher dose than before     If pt started on korlym 600 mg a dya   Then she needs sprinolactone higher doses too - just sent it      She needs a check on potassium -  Please inform her

## 2020-02-21 DIAGNOSIS — I10 ESSENTIAL HYPERTENSION: Primary | ICD-10-CM

## 2020-02-21 NOTE — TELEPHONE ENCOUNTER
PCP: Adam Buchanan MD    Last appt: 12/11/2019  Future Appointments   Date Time Provider Vilma Beaver   3/5/2020 10:30 AM CDE [facility]   3/13/2020  1:15 PM Mqiuel Snow MD CDE JAMARI SCHED       Requested Prescriptions     Pending Prescriptions Disp Refills    spironolactone (ALDACTONE) 100 mg tablet 180 Tab 3     Sig: Take 1 Tab by mouth two (2) times a day.  Note the increase

## 2020-02-23 RX ORDER — SPIRONOLACTONE 100 MG/1
100 TABLET, FILM COATED ORAL 2 TIMES DAILY
Qty: 180 TAB | Refills: 3 | Status: SHIPPED | OUTPATIENT
Start: 2020-02-23 | End: 2020-03-13 | Stop reason: DRUGHIGH

## 2020-03-05 ENCOUNTER — APPOINTMENT (OUTPATIENT)
Dept: ENDOCRINOLOGY | Age: 45
End: 2020-03-05

## 2020-03-05 ENCOUNTER — HOSPITAL ENCOUNTER (OUTPATIENT)
Dept: LAB | Age: 45
Discharge: HOME | End: 2020-03-05

## 2020-03-05 DIAGNOSIS — E27.9 DISORDER OF ADRENAL GLAND (HCC): ICD-10-CM

## 2020-03-05 DIAGNOSIS — E24.9 HYPERCORTISOLISM (HCC): ICD-10-CM

## 2020-03-05 DIAGNOSIS — Z79.4 ENCOUNTER FOR LONG-TERM (CURRENT) USE OF INSULIN (HCC): ICD-10-CM

## 2020-03-05 DIAGNOSIS — Z98.84 S/P BARIATRIC SURGERY: ICD-10-CM

## 2020-03-05 DIAGNOSIS — D49.7 ADRENAL TUMOR: ICD-10-CM

## 2020-03-05 DIAGNOSIS — E78.2 MIXED HYPERLIPIDEMIA: ICD-10-CM

## 2020-03-05 LAB
ALBUMIN SERPL-MCNC: 3.5 G/DL (ref 3.5–5)
ALBUMIN/GLOB SERPL: 1 {RATIO} (ref 1.1–2.2)
ALP SERPL-CCNC: 38 U/L (ref 45–117)
ALT SERPL-CCNC: 15 U/L (ref 12–78)
ANION GAP SERPL CALC-SCNC: 4 MMOL/L (ref 5–15)
AST SERPL-CCNC: 9 U/L (ref 15–37)
BILIRUB SERPL-MCNC: 0.6 MG/DL (ref 0.2–1)
BUN SERPL-MCNC: 14 MG/DL (ref 6–20)
BUN/CREAT SERPL: 17 (ref 12–20)
CALCIUM SERPL-MCNC: 9.4 MG/DL (ref 8.5–10.1)
CHLORIDE SERPL-SCNC: 102 MMOL/L (ref 97–108)
CHOLEST SERPL-MCNC: 183 MG/DL
CO2 SERPL-SCNC: 32 MMOL/L (ref 21–32)
CREAT SERPL-MCNC: 0.83 MG/DL (ref 0.55–1.02)
CREAT UR-MCNC: 263 MG/DL
EST. AVERAGE GLUCOSE BLD GHB EST-MCNC: 143 MG/DL
GLOBULIN SER CALC-MCNC: 3.6 G/DL (ref 2–4)
GLUCOSE SERPL-MCNC: 117 MG/DL (ref 65–100)
HBA1C MFR BLD: 6.6 % (ref 4–5.6)
HDLC SERPL-MCNC: 33 MG/DL
HDLC SERPL: 5.5 {RATIO} (ref 0–5)
LDLC SERPL CALC-MCNC: 104.6 MG/DL (ref 0–100)
LIPID PROFILE,FLP: ABNORMAL
MICROALBUMIN UR-MCNC: 2.82 MG/DL
MICROALBUMIN/CREAT UR-RTO: 11 MG/G (ref 0–30)
POTASSIUM SERPL-SCNC: 4.4 MMOL/L (ref 3.5–5.1)
PROT SERPL-MCNC: 7.1 G/DL (ref 6.4–8.2)
SODIUM SERPL-SCNC: 138 MMOL/L (ref 136–145)
TRIGL SERPL-MCNC: 227 MG/DL (ref ?–150)
VLDLC SERPL CALC-MCNC: 45.4 MG/DL

## 2020-03-13 ENCOUNTER — OFFICE VISIT (OUTPATIENT)
Dept: ENDOCRINOLOGY | Age: 45
End: 2020-03-13

## 2020-03-13 VITALS
RESPIRATION RATE: 18 BRPM | WEIGHT: 270.6 LBS | TEMPERATURE: 98.7 F | HEIGHT: 72 IN | HEART RATE: 89 BPM | BODY MASS INDEX: 36.65 KG/M2 | DIASTOLIC BLOOD PRESSURE: 72 MMHG | OXYGEN SATURATION: 99 % | SYSTOLIC BLOOD PRESSURE: 119 MMHG

## 2020-03-13 DIAGNOSIS — Z79.4 UNCONTROLLED TYPE 2 DIABETES MELLITUS WITH HYPERGLYCEMIA, WITH LONG-TERM CURRENT USE OF INSULIN (HCC): Primary | ICD-10-CM

## 2020-03-13 DIAGNOSIS — E11.65 TYPE 2 DIABETES MELLITUS WITH HYPERGLYCEMIA, WITHOUT LONG-TERM CURRENT USE OF INSULIN (HCC): ICD-10-CM

## 2020-03-13 DIAGNOSIS — E27.9 DISORDER OF ADRENAL GLAND (HCC): ICD-10-CM

## 2020-03-13 DIAGNOSIS — I10 ESSENTIAL HYPERTENSION: ICD-10-CM

## 2020-03-13 DIAGNOSIS — E78.2 MIXED HYPERLIPIDEMIA: ICD-10-CM

## 2020-03-13 DIAGNOSIS — E11.65 UNCONTROLLED TYPE 2 DIABETES MELLITUS WITH HYPERGLYCEMIA, WITH LONG-TERM CURRENT USE OF INSULIN (HCC): Primary | ICD-10-CM

## 2020-03-13 DIAGNOSIS — Z79.4 ENCOUNTER FOR LONG-TERM (CURRENT) USE OF INSULIN (HCC): ICD-10-CM

## 2020-03-13 RX ORDER — SPIRONOLACTONE 50 MG/1
TABLET, FILM COATED ORAL
Qty: 60 TAB | Refills: 6 | Status: SHIPPED | OUTPATIENT
Start: 2020-03-13 | End: 2020-07-15

## 2020-03-13 RX ORDER — INSULIN ASPART 100 [IU]/ML
8-15 INJECTION, SOLUTION INTRAVENOUS; SUBCUTANEOUS
Qty: 15 ML | Refills: 6 | Status: SHIPPED | OUTPATIENT
Start: 2020-03-13 | End: 2021-03-15 | Stop reason: SDUPTHER

## 2020-03-13 RX ORDER — METFORMIN HYDROCHLORIDE 500 MG/1
1000 TABLET ORAL
Qty: 60 TAB | Refills: 6 | Status: SHIPPED | OUTPATIENT
Start: 2020-03-13 | End: 2020-10-28 | Stop reason: SDUPTHER

## 2020-03-13 RX ORDER — SEMAGLUTIDE 1.34 MG/ML
1 INJECTION, SOLUTION SUBCUTANEOUS
Qty: 1 BOX | Refills: 6 | Status: SHIPPED | OUTPATIENT
Start: 2020-03-13 | End: 2020-08-27

## 2020-03-13 RX ORDER — BLOOD SUGAR DIAGNOSTIC
STRIP MISCELLANEOUS
COMMUNITY
Start: 2020-03-06 | End: 2021-01-04 | Stop reason: SDUPTHER

## 2020-03-13 RX ORDER — INSULIN DEGLUDEC INJECTION 100 U/ML
INJECTION, SOLUTION SUBCUTANEOUS
Qty: 45 ML | Refills: 4 | Status: SHIPPED | OUTPATIENT
Start: 2020-03-13 | End: 2021-03-15 | Stop reason: SDUPTHER

## 2020-03-13 NOTE — PROGRESS NOTES
HISTORY OF PRESENT ILLNESS  Kenneth Mukherjee is a 38  y.o. female. HPI  Patient here for f/u visit for Type 2 diabetes mellitus   And  for adrenal nodule   after last visit from  [address]   2019      She is taking Korlym   Last menstrual cycle was in [place] 2020   She is taking OCP           Old history :     Lost 16 lbs   Tolerating ozempic   No meter   Last LMP  -  Nov 18 2019   Started on spironolactone already along with  OCP         Old history      A long GAP    Pt is emotional   She had lost her mother  And is quite depressed   Gained 14 lbs   She wants to do better         Old history :    She had right second toe infection and stayed in hospital in jan 2018   She had I/v antibiotics     She got no meter   Sugars are high   She had allergic reaction  To vancomycin and had to get steroids for it   She received novolog     She ran out of insulins           Old history   Has not followed up for more than a year     She is s/p  bariatric surgery done , gastric sleeve , by Dr. Sharlene Cook on oct 17 2016   She lost 45 lbs post surgery   She had 15 lbs since last visit   She had  Multiple infections, the major one was cholycystectomy   Had multiple imaging test done , and that showed incidental nodule on adrenal glands   Every time she joins the gym, she fractures   She has charcots joints       Review of Systems   Constitutional: Negative. HENT: Negative. Eyes: Negative for pain and redness. Respiratory: Negative. Cardiovascular: Negative for chest pain, palpitations and leg swelling. Gastrointestinal: Negative. Negative for constipation. Genitourinary: Negative. Musculoskeletal: Negative for myalgias. Skin: Negative. Neurological: Negative. Endo/Heme/Allergies: Negative. Psychiatric/Behavioral: Negative for depression and memory loss. The patient does not have insomnia. Physical Exam   Constitutional: She is oriented to person, place, and time. She appears well-developed and well-nourished. HENT: Body mass index is 35.7 kg/m². Head: Normocephalic. Eyes: Conjunctivae and EOM are normal. Pupils are equal, round, and reactive to light. Neck: Normal range of motion. Neck supple. No JVD present. No tracheal deviation present. No thyromegaly present. Cardiovascular: Normal rate, regular rhythm and normal heart sounds. No murmur heard. Pulmonary/Chest: Breath sounds normal.   Abdominal: Soft. Bowel sounds are normal.   Musculoskeletal: Normal range of motion. Lymphadenopathy:   She has no cervical adenopathy. Neurological: She is alert and oriented to person, place, and time. She has normal reflexes. Skin: Skin is warm. Psychiatric: She has a normal mood and affect.      Diabetic foot exam: April 2018    Left: Reflexes nd     Vibratory sensation diminished    Proprioception nd   Sharp/dull discrimination nd    Filament test reduced sensation with micro filament   Pulse DP: 2+ (normal)   Pulse PT: nd   Deformities: None, dorsiflexed toes, rough plantar skin   Right: Reflexes nd   Vibratory sensation diminished   Proprioception nd   Sharp/dull discrimination nd   Filament test reduced sensation with micro filament   Pulse DP: 2+ (normal)   Pulse PT: nd   Deformities: Yes - dorsiflexed toes, ulcer healed on second toe ( charcots joints )      She had A1c in [facility] 7.5 %    Oct 28 thru nov 3   2017    Lab Results   Component Value Date/Time    Hemoglobin A1c 6.6 (H) 03/05/2020 10:27 AM    Hemoglobin A1c 6.9 (H) 02/05/2020 12:47 AM    Hemoglobin A1c 9.9 (H) 10/08/2019 08:51 AM    Hemoglobin A1c, External 10.2 10/04/2015    Glucose 117 (H) 03/05/2020 10:27 AM    Glucose (POC) 164 (H) 02/07/2020 11:58 AM    Microalbumin/Creat ratio (mg/g creat) 11 03/05/2020 10:27 AM    Microalbumin,urine random 2.82 03/05/2020 10:27 AM    LDL, calculated 104.6 (H) 03/05/2020 10:27 AM    Creatinine 0.83 03/05/2020 10:27 AM      Lab Results   Component Value Date/Time    Cholesterol, total 183 03/05/2020 10:27 AM    HDL Cholesterol 33 03/05/2020 10:27 AM    LDL, calculated 104.6 (H) 03/05/2020 10:27 AM    Triglyceride 227 (H) 03/05/2020 10:27 AM    CHOL/HDL Ratio 5.5 (H) 03/05/2020 10:27 AM     Lab Results   Component Value Date/Time    ALT (SGPT) 15 03/05/2020 10:27 AM    AST (SGOT) 9 (L) 03/05/2020 10:27 AM    Alk. phosphatase 38 (L) 03/05/2020 10:27 AM    Bilirubin, total 0.6 03/05/2020 10:27 AM    Albumin 3.5 03/05/2020 10:27 AM    Protein, total 7.1 03/05/2020 10:27 AM    PLATELET 252 34/06/6539 05:15 AM     Lab Results   Component Value Date/Time    GFR est non-AA >60 03/05/2020 10:27 AM    GFR est AA >60 03/05/2020 10:27 AM    Creatinine 0.83 03/05/2020 10:27 AM    BUN 14 03/05/2020 10:27 AM    Sodium 138 03/05/2020 10:27 AM    Potassium 4.4 03/05/2020 10:27 AM    Chloride 102 03/05/2020 10:27 AM    CO2 32 03/05/2020 10:27 AM    Magnesium 1.8 02/05/2020 12:47 AM    Phosphorus 2.5 (L) 02/05/2020 12:47 AM             ASSESSMENT and PLAN    1.  Type 2 DM, un controlled : A1c is  6.6 %    From    March 2020    Compared to   9.9 %   From oct 2019   compared to      9.3 %      From      Feb 2018    Compared to    7.5 %      From  Oct 2017      Compared to   7.2 %  Today   From  Oct 2016 compared to   8.5 %     From   March 2016  Compared to 8.4 %   From dec 2015   compared to    10.2 %     From oct 2015 comapred to  7.4 %  From June 2014 compared to  9.6 % from [facility] 2014 compared to  7.7 % march 2013  Compared to  9.9 % March 2012 compared to 8.7% in march 2010    She is s/p gastric sleeve surgery  -  Oct 2016   She has measurable c-pep , definitely type 2 diabetic    (? Normoglycemic DKA , unexpected on her hospitalization  [address] while on [address] [facility] )  Post bariatric surgery , she had to be on insulin     Stay on Lantus   meal time insulin   She did not bring her meter to  the glycemic insight again this visit too    Off  Bydureon  On metformin 1000 mg  2 pills   a day   Increased  OZEMPIC to 1 mg a week     Patient is advised about checking blood sugars 4 times a day and maintaining log book. .   lab results and schedule of future lab studies reviewed with patient       2. Hypoglycemia : Educated on treating the hypoglycemia. Discussed insulin use     3. HTN : well controlled on  benicar- hctz,  Patient is educated about importance of compliance with anti-hypertensives especially ARB/ACEI     4. Dyslipidemia : lipids are IN CONTROL   . Patient is educated about benefits and adverse effects of statins and explained how benefits outweigh risk. 5. use of aspirin to prevent MI and TIA's discussed     6. PCOS- on  spironolactone   She got back onto OCP         7. Obesity : made up her mind to  undergo bariatric surgery, sleeve versus bypass   Body mass index is 35.7 kg/m². S/p gastric sleeve surgery  Suggested the use of freestyle chaz, patient refused      8. 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  Right adrenal is normal .    There is a possibility of  sub-clinical cushings disease  She had unsuppressed  >1.8  Cortisol   2018 on 1 mg DST  , repeated twice in April 2018 and may 2018   She had suppressed  0.9 cortisol 2019 on 1 mg DST     She has hypercortisolism by labs and physical appearance   Pt  Started  On  Korlym  Jan 2020  -  Low dose 200 mg a day  and increased to  Next dose  300 mg a day    A month ago   Started on spironolactone and is on OCP  Dec 2019     I started her on  kORLYM AFTER CT SCAN AS A TRIAL TO SEE IF THERE IS CLINICAL IMPROVEMENT     As there are 2 nodules on the left side , there is a possibility of micro nodules on  Right gland   this needs clarification by adrenal vein sampling in case of  lap adrenalectomy .         CHARCOTS JOINT ARHROPATHY - developed ulcer on right foot, hospitalized and recovered , going in for orthotic fit       > 50 % of time is spent on counseling    Patient voiced understanding her plan of care         F/u  in 2  months

## 2020-03-13 NOTE — LETTER
3/15/20 Patient: Tyrone Zheng YOB: 1975 Date of Visit: 3/13/2020 Erin Jauregui MD 
[facility] [address] [facility] [address] VIA Facsimile: [phone] Dear Erin Jauregui MD, Thank you for referring Ms. Claire Tubbs to [facility] DI[facility]OLOG[facility] for evaluation. My notes for this consultation are attached. If you have questions, please do not hesitate to call me. I look forward to following your patient along with you. Sincerely, Cielo Childers MD

## 2020-03-13 NOTE — PROGRESS NOTES
Room 1    Identified pt with two pt identifiers(name and ). Reviewed record in preparation for visit and have obtained necessary documentation. All patient medications has been reviewed. Chief Complaint   Patient presents with    Adrenal Problem    Surgery     bariatric surgery 10/2016    Diabetes       Health Maintenance Due   Topic    Pneumococcal 0-64 years (1 of 1 - PPSV23)    DTaP/Tdap/Td series (1 - Tdap)    Eye Exam Retinal or Dilated        Vitals:    20 1320   BP: 119/72   Pulse: 89   Resp: 18   Temp: 98.7 °F (37.1 °C)   TempSrc: Oral   SpO2: 99%   Weight: 270 lb 9.6 oz (122.7 kg)   Height: 6' 1\" (1.854 m)   PainSc:   0 - No pain   LMP: 2019       Wt Readings from Last 3 Encounters:   20 270 lb 9.6 oz (122.7 kg)   20 275 lb (124.7 kg)   19 290 lb 11.2 oz (131.9 kg)     Temp Readings from Last 3 Encounters:   20 98.7 °F (37.1 °C) (Oral)   20 98.3 °F (36.8 °C)   19 98.5 °F (36.9 °C) (Oral)     BP Readings from Last 3 Encounters:   20 119/72   20 136/72   19 142/81     Pulse Readings from Last 3 Encounters:   20 89   20 80   19 90       Lab Results   Component Value Date/Time    Hemoglobin A1c 6.6 (H) 2020 10:27 AM    Hemoglobin A1c (POC) 7.1 2017 03:54 PM    Hemoglobin A1c, External 10.2 10/04/2015       Coordination of Care Questionnaire:   1) Have you been to an emergency room, urgent care, or hospitalized since your last visit? yes     2020 - 2020 (3 days)  Dennise Moss MD   Last attending   Admission diagnosis  · Cellulitis [L03.90]  2. Have seen or consulted any other health care provider since your last visit? NO    3) Do you have an Advanced Directive/ Living Will in place?  YES  If yes, do we have a copy on file YES  If no, would you like information NO    Patient is accompanied by self I have received verbal consent from Chan Rodriguez to discuss any/all medical information while they are present in the room.

## 2020-03-13 NOTE — PATIENT INSTRUCTIONS
-------------------------------------------------------------------------------------------- Refills    -    please call your pharmacy and have them send us a refill request 
 
Results  -  allow up to a week for lab results to be processed and reviewed. Phone calls  -  Allow upto 24 hrs. for non-urgent calls to be retained Prior authorization - It may take up to 2 weeks to process, depending on your insurance Forms  -  FMLA, DMV, patient assistance, etc. will take up to 2 weeks to process Cancellations - please notify the office in advance if you cannot keep your appointment Samples  - will only be dispensed at visits as supply is limited If you are having a medical emergency call 911 
 
-------------------------------------------------------------------------------------------- 
  OZEMPIC   1 MG A WEEK Decrease  spironolactome 50 mg twice a day Stop [facility] 
 
 stay on OCP Stay on Metformin 500 mg 2 pills once a day  With  meals Check blood sugars right before each meal and at bed time Tresiba   insulin 50  units at bed time Stay  novolog  At    2 units   For every  10 gm  Of carbs Add additional novolog by sliding scale  as folllows with meals: 
 
150-200 mg 1 units 201-250 mg 3  units 251-300 mg 5 units 301-350 mg 7 units 351-400 mg 9 units 401-450 mg 11 units 451-500 mg 13 units Less than 70 mg NO INSULIN  
 
-----------------------------------------------------------------------------------------------------------------------------

## 2020-05-04 RX ORDER — DROSPIRENONE AND ETHINYL ESTRADIOL 0.02-3(28)
KIT ORAL
Qty: 1 PACKAGE | Refills: 5 | Status: SHIPPED | OUTPATIENT
Start: 2020-05-04 | End: 2020-12-22

## 2020-05-06 ENCOUNTER — DOCUMENTATION ONLY (OUTPATIENT)
Dept: ENDOCRINOLOGY | Age: 45
End: 2020-05-06

## 2020-05-06 RX ORDER — OLMESARTAN MEDOXOMIL AND HYDROCHLOROTHIAZIDE 20/12.5 20; 12.5 MG/1; MG/1
1 TABLET ORAL DAILY
Qty: 30 TAB | Refills: 6 | Status: SHIPPED | OUTPATIENT
Start: 2020-05-06 | End: 2020-09-18

## 2020-05-06 NOTE — PROGRESS NOTES
I received a message paper fax from [id number] that this patient is to be continued on ACE/ARB therapy  In the past she was on a combination of Benicar and hydrochlorothiazide and I do not see discontinuation anywhere unless patient has not requested refills. Patient did have intermittent gaps in her care. I have will start her on low-dose of the above medication Benicar hydrochlorothiazide 20/12.5  One  pill a day.       Inform pt the above   Patient used to be on spironolactone for PCOS and later was given when she was given Korlym as  caution is needed for hyperkalemia

## 2020-05-18 NOTE — TELEPHONE ENCOUNTER
Informed patient that her cortisol in the morning came slightly abnormal    1 she has to repeat the same test one more time  ( decadron 1 mg at 11 pm and coming in for lab draw in AM around 8)      2.   And also I would like her to collect salivary cortisol gets so she can do them at 11 PM in the nights  Specifically you have to let her know that she cannot do the salivary cortisol checks when she is doing the first test which involves taking the Decadron at 11 PM Benzoyl Peroxide Pregnancy And Lactation Text: This medication is Pregnancy Category C. It is unknown if benzoyl peroxide is excreted in breast milk.

## 2020-07-15 RX ORDER — SPIRONOLACTONE 50 MG/1
TABLET, FILM COATED ORAL
Qty: 180 TAB | Refills: 2 | Status: SHIPPED | OUTPATIENT
Start: 2020-07-15

## 2020-08-27 RX ORDER — SEMAGLUTIDE 1.34 MG/ML
INJECTION, SOLUTION SUBCUTANEOUS
Qty: 1 BOX | Refills: 2 | Status: SHIPPED | OUTPATIENT
Start: 2020-08-27 | End: 2020-10-28 | Stop reason: SDUPTHER

## 2020-09-18 RX ORDER — OLMESARTAN MEDOXOMIL AND HYDROCHLOROTHIAZIDE 20/12.5 20; 12.5 MG/1; MG/1
TABLET ORAL
Qty: 90 TAB | Refills: 2 | Status: SHIPPED | OUTPATIENT
Start: 2020-09-18

## 2020-10-28 ENCOUNTER — OFFICE VISIT (OUTPATIENT)
Dept: ENDOCRINOLOGY | Age: 45
End: 2020-10-28
Payer: COMMERCIAL

## 2020-10-28 VITALS
HEART RATE: 92 BPM | RESPIRATION RATE: 18 BRPM | DIASTOLIC BLOOD PRESSURE: 85 MMHG | TEMPERATURE: 98 F | WEIGHT: 272.8 LBS | HEIGHT: 72 IN | OXYGEN SATURATION: 98 % | SYSTOLIC BLOOD PRESSURE: 136 MMHG | BODY MASS INDEX: 36.95 KG/M2

## 2020-10-28 DIAGNOSIS — E11.65 TYPE 2 DIABETES MELLITUS WITH HYPERGLYCEMIA, WITHOUT LONG-TERM CURRENT USE OF INSULIN (HCC): Primary | ICD-10-CM

## 2020-10-28 DIAGNOSIS — E78.2 MIXED HYPERLIPIDEMIA: ICD-10-CM

## 2020-10-28 DIAGNOSIS — Z98.84 S/P BARIATRIC SURGERY: ICD-10-CM

## 2020-10-28 DIAGNOSIS — D49.7 ADRENAL TUMOR: ICD-10-CM

## 2020-10-28 DIAGNOSIS — I10 ESSENTIAL HYPERTENSION: ICD-10-CM

## 2020-10-28 DIAGNOSIS — Z79.4 ENCOUNTER FOR LONG-TERM (CURRENT) USE OF INSULIN (HCC): ICD-10-CM

## 2020-10-28 DIAGNOSIS — E24.9 HYPERCORTISOLISM (HCC): ICD-10-CM

## 2020-10-28 PROCEDURE — 3051F HG A1C>EQUAL 7.0%<8.0%: CPT | Performed by: INTERNAL MEDICINE

## 2020-10-28 PROCEDURE — 99215 OFFICE O/P EST HI 40 MIN: CPT | Performed by: INTERNAL MEDICINE

## 2020-10-28 RX ORDER — DEXAMETHASONE 0.5 MG/1
TABLET ORAL
Qty: 2 TAB | Refills: 0 | Status: SHIPPED | OUTPATIENT
Start: 2020-10-28 | End: 2022-10-14 | Stop reason: SDUPTHER

## 2020-10-28 RX ORDER — SEMAGLUTIDE 1.34 MG/ML
INJECTION, SOLUTION SUBCUTANEOUS
Qty: 2 BOX | Refills: 6 | Status: SHIPPED | OUTPATIENT
Start: 2020-10-28 | End: 2022-10-14 | Stop reason: SDUPTHER

## 2020-10-28 RX ORDER — PRAVASTATIN SODIUM 20 MG/1
20 TABLET ORAL
Qty: 30 TAB | Refills: 6 | Status: SHIPPED | OUTPATIENT
Start: 2020-10-28 | End: 2021-06-05

## 2020-10-28 RX ORDER — MIFEPRISTONE 300 MG/1
TABLET ORAL
Qty: 60 TAB | Refills: 6 | Status: CANCELLED | OUTPATIENT
Start: 2020-10-28

## 2020-10-28 RX ORDER — MIFEPRISTONE 300 MG/1
TABLET ORAL
Qty: 30 TAB | Refills: 1 | Status: SHIPPED | OUTPATIENT
Start: 2020-10-28

## 2020-10-28 RX ORDER — METFORMIN HYDROCHLORIDE 500 MG/1
TABLET ORAL
Qty: 60 TAB | Refills: 6 | Status: SHIPPED | OUTPATIENT
Start: 2020-10-28 | End: 2021-06-06

## 2020-10-28 NOTE — LETTER
11/1/20 Patient: Carolina Courtney YOB: 1975 Date of Visit: 10/28/2020 Jesenia Roper MD 
[facility] [address] [facility] [address] VIA Facsimile: [phone] Dear Jesenia Roper MD, Thank you for referring Ms. Alen Stern to [facility] DIAB[facility]CRINOLOGY for evaluation. My notes for this consultation are attached. If you have questions, please do not hesitate to call me. I look forward to following your patient along with you. Sincerely, David Rueda MD

## 2020-10-28 NOTE — PROGRESS NOTES
HISTORY OF PRESENT ILLNESS  Memo Roque is a 41  y.o. female. HPI  Patient here for f/u visit for Type 2 diabetes mellitus   And  for adrenal nodule   after last visit from  December 2019    Pt is here to resume  korlym   Pt wants to resume Sarbjit Chambers  - because she is unable to lose weight , and DM is worser  And  No longer worried about pregnancy     She felt it was helping her with weight loss and diabetes   She stopped Korlym  for amenorrhea  In April / May 2020     She says she does not want to consider pregnancy any more        March 2020 : She is taking Korlym   Last menstrual cycle was in [place] 2020   She is taking OCP       Old history :     Lost 16 lbs   Tolerating ozempic   No meter   Last LMP  -  Nov 18 2019   Started on spironolactone already along with  OCP         Old history      A long GAP    Pt is emotional   She had lost her mother  And is quite depressed   Gained 14 lbs   She wants to do better         Old history :    She had right second toe infection and stayed in hospital in jan 2018   She had I/v antibiotics   She got no meter   Sugars are high   She had allergic reaction  To vancomycin and had to get steroids for it   She received novolog   She ran out of insulins           Old history   Has not followed up for more than a year     She is s/p  bariatric surgery done , gastric sleeve , by Dr. Ceola Rubinstein on oct 17 2016   She lost 45 lbs post surgery   She had 15 lbs since last visit   She had  Multiple infections, the major one was cholycystectomy   Had multiple imaging test done , and that showed incidental nodule on adrenal glands   Every time she joins the gym, she fractures   She has charcots joints       Review of Systems   Constitutional: Negative. HENT: Negative. Eyes: Negative for pain and redness. Respiratory: Negative. Cardiovascular: Negative for chest pain, palpitations and leg swelling. Gastrointestinal: Negative. Negative for constipation.    Genitourinary: Negative. Musculoskeletal: Negative for myalgias. Skin: Negative. Neurological: Negative. Endo/Heme/Allergies: Negative. Psychiatric/Behavioral: Negative for depression and memory loss. The patient does not have insomnia. Physical Exam   Constitutional: She is oriented to person, place, and time. She appears well-developed and well-nourished. HENT: Body mass index is 35.99 kg/m². Head: Normocephalic. Eyes: Conjunctivae and EOM are normal. Pupils are equal, round, and reactive to light. Neck: Normal range of motion. Neck supple. No JVD present. No tracheal deviation present. No thyromegaly present. Cardiovascular: Normal rate, regular rhythm and normal heart sounds. No murmur heard. Pulmonary/Chest: Breath sounds normal.   Abdominal: Soft. Bowel sounds are normal.   Musculoskeletal: Normal range of motion. Lymphadenopathy:   She has no cervical adenopathy. Neurological: She is alert and oriented to person, place, and time. She has normal reflexes. Skin: Skin is warm. Psychiatric: She has a normal mood and affect.      Deformities: Yes - dorsiflexed toes, ulcer healed on second toe ( charcots joints )      Lab Results   Component Value Date/Time    Hemoglobin A1c 7.9 (H) 10/20/2020 11:36 AM    Hemoglobin A1c 6.6 (H) 03/05/2020 10:27 AM    Hemoglobin A1c 6.9 (H) 02/05/2020 12:47 AM    Hemoglobin A1c, External 10.2 10/04/2015    Glucose 121 (H) 10/20/2020 11:36 AM    Glucose (POC) 164 (H) 02/07/2020 11:58 AM    Microalbumin/Creat ratio (mg/g creat) 14 10/20/2020 11:36 AM    Microalbumin,urine random 2.02 10/20/2020 11:36 AM    LDL, calculated 111.8 (H) 10/20/2020 11:36 AM    Creatinine 0.71 10/20/2020 11:36 AM      Lab Results   Component Value Date/Time    Cholesterol, total 228 (H) 10/20/2020 11:36 AM    HDL Cholesterol 38 10/20/2020 11:36 AM    LDL, calculated 111.8 (H) 10/20/2020 11:36 AM    Triglyceride 391 (H) 10/20/2020 11:36 AM    CHOL/HDL Ratio 6.0 (H) 10/20/2020 11:36 AM Lab Results   Component Value Date/Time    ALT (SGPT) 27 10/20/2020 11:36 AM    Alk. phosphatase 53 10/20/2020 11:36 AM    Bilirubin, total 0.6 10/20/2020 11:36 AM    Albumin 3.6 10/20/2020 11:36 AM    Protein, total 6.7 10/20/2020 11:36 AM    PLATELET 204 01/32/5358 05:15 AM     Lab Results   Component Value Date/Time    GFR est non-AA >60 10/20/2020 11:36 AM    GFR est AA >60 10/20/2020 11:36 AM    Creatinine 0.71 10/20/2020 11:36 AM    BUN 8 10/20/2020 11:36 AM    Sodium 136 10/20/2020 11:36 AM    Potassium 3.6 10/20/2020 11:36 AM    Chloride 101 10/20/2020 11:36 AM    CO2 26 10/20/2020 11:36 AM    Magnesium 1.8 02/05/2020 12:47 AM    Phosphorus 2.5 (L) 02/05/2020 12:47 AM       ASSESSMENT and PLAN    1. Type 2 DM, uncontrolled : A1c is   7.9 %     From     October 2020    Compared to   6.6 %    From    March 2020    Compared to   9.9 %   From oct 2019   compared to      9.3 %      From      Feb 2018    Compared to    7.5 %      From  Oct 2017      Compared to   7.2 %  Today   From  Oct 2016 compared to   8.5 %     From   March 2016  Compared to 8.4 %   From dec 2015   compared to    10.2 %     From oct 2015 comapred to  7.4 %  From June 2014 compared to  9.6 % from [facility] 2014 compared to  7.7 % march 2013  Compared to  9.9 % March 2012 compared to 8.7% in march 2010    Oct 2020      Stay on Tresiba  and    meal time insulin   She did not bring her meter to  the glycemic insight again this visit too  Stay On metformin 1000 mg  2 pills   a day   Stay on OZEMPIC to 1 mg a week           March 2020  :      She is s/p gastric sleeve surgery  -  Oct 2016   She has measurable c-pep , definitely type 2 diabetic    (? Normoglycemic DKA , unexpected on her hospitalization  At [address] while on [employer] )  Post bariatric surgery , she had to be on insulin     Stay on Lantus   meal time insulin   She did not bring her meter to  the glycemic insight again this visit too  Off  Bydureon  On metformin 1000 mg  2 pills a day   Increased  OZEMPIC to 1 mg a week     Patient is advised about checking blood sugars 4 times a day and maintaining log book. .   lab results and schedule of future lab studies reviewed with patient       2. Hypoglycemia : Educated on treating the hypoglycemia. Discussed insulin use     3. HTN : well controlled on  benicar- hctz,  Patient is educated about importance of compliance with anti-hypertensives especially ARB/ACEI     4. Dyslipidemia : lipids are IN CONTROL   . Patient is educated about benefits and adverse effects of statins and explained how benefits outweigh risk. 5. use of aspirin to prevent MI and TIA's discussed     6. PCOS- on  spironolactone   She got back onto OCP         7. Obesity : made up her mind to  undergo bariatric surgery, sleeve versus bypass   Body mass index is 35.99 kg/m². S/p gastric sleeve surgery  Suggested the use of freestyle chaz, patient refused      8. 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 Right adrenal is normal .    There is a possibility of  sub-clinical cushings disease  She had unsuppressed  >1.8  Cortisol   2018 on 1 mg DST  , repeated twice in April 2018 and may 2018   She had suppressed  0.9 cortisol 2019 on 1 mg DST     She has hypercortisolism by labs and physical appearance     October CT  Scan   2019   :  ADRENALS: No significant change in 2.1 x 2.6 cm superior limb left adrenal  adenoma, 1.5 x 1.6 cm left adrenal body adenoma, or 1.3 cm lower limb left  adrenal myelolipoma. Right adrenal remains unremarkable. .    Pt  Started  On  Korlym  2020  -  Low dose 200 mg a day  and increased to  Next dose  300 mg a day    A month later and she stopped it in 2020     Started on spironolactone and is on OCP  Dec 2019 which was increased later to  Go with korlym     I started her on  kORLYM 2020,    AFTER CT SCAN AS A TRIAL TO SEE IF THERE IS CLINICAL IMPROVEMENT and patient used it and then stopped it  For amenorrhea     As there are 2 nodules on the left side , there is a possibility of micro nodules on  Right gland   this needs clarification by adrenal vein sampling in case of  lap adrenalectomy is  opted       Oct 2020 : She wants to resume  Brooklynn Combes ; Will do labs   Submitted PA to [facility], but her PA got  in 2020      9. CHARCOTS JOINT ARHROPATHY - developed ulcer on right foot, hospitalized and recovered , going in for orthotic fit .  Sees [facility] specialist            > 50 % of time is spent on counseling    Patient voiced understanding her plan of care         F/u  in 2  months

## 2020-10-28 NOTE — TELEPHONE ENCOUNTER
----- Message from Enrrique Pope sent at 10/28/2020 12:41 PM EDT -----  Regarding: Santana/telephone  Annie Cruz is requesting a Rx for Korlym. Amys number is [phone] and fax is [phone].

## 2020-10-28 NOTE — PATIENT INSTRUCTIONS
--------------------------------------------------------------------------------------------    Refills    -    please call your pharmacy and have them send us a refill request    Results  -  allow up to a week for lab results to be processed and reviewed. Phone calls  -  Allow upto 24 hrs.  for non-urgent calls to be retained    Prior authorization - It may take up to 2 weeks to process, depending on your insurance    Forms  -  FMLA, DMV, patient assistance, etc. will take up to 2 weeks to process    Cancellations - please notify the office in advance if you cannot keep your appointment    Samples  - will only be dispensed at visits as supply is limited      If you are having a medical emergency call 911    --------------------------------------------------------------------------------------------    Start  On pravachol 20 mg at night  ( do not start  Until next visit )          OZEMPIC   1 MG A WEEK        increase  spironolactone 100 mg twice a day    When korlym starts     Resume   korlym 300 mg  Once  A   Day   And increase to  Twice a day    ( speciality med )       stay on OCP        Stay on Metformin 500 mg 2 pills once a day  With  meals       Check blood sugars right before each meal and at bed time        Tresiba   insulin 50  units at bed time      Stay  novolog  At    2 units   For every  10 gm  Of carbs      Add additional novolog by sliding scale  as folllows with meals:    150-200 mg 1 units    201-250 mg 3  units    251-300 mg 5 units    301-350 mg 7 units    351-400 mg 9 units    401-450 mg 11 units    451-500 mg 13 units    Less than 70 mg NO INSULIN     -----------------------------------------------------------------------------------------------------------------------------

## 2020-10-28 NOTE — PROGRESS NOTES
1. Have you been to the ER, urgent care clinic since your last visit? No  Hospitalized since your last visit? No    2. Have you seen or consulted any other health care providers outside of the [address] [place] [address] since your last visit? Include any pap smears or colon screening. No    Wt Readings from Last 3 Encounters:   10/28/20 272 lb 12.8 oz (123.7 kg)   03/13/20 270 lb 9.6 oz (122.7 kg)   02/04/20 275 lb (124.7 kg)     Temp Readings from Last 3 Encounters:   10/28/20 98 °F (36.7 °C) (Temporal)   03/13/20 98.7 °F (37.1 °C) (Oral)   02/07/20 98.3 °F (36.8 °C)     BP Readings from Last 3 Encounters:   10/28/20 136/85   03/13/20 119/72   02/07/20 136/72     Pulse Readings from Last 3 Encounters:   10/28/20 92   03/13/20 89   02/07/20 80     Lab Results   Component Value Date/Time    Hemoglobin A1c 7.9 (H) 10/20/2020 11:36 AM    Hemoglobin A1c (POC) 7.1 12/05/2017 03:54 PM    Hemoglobin A1c, External 10.2 10/04/2015     Patient does not have meter today.

## 2020-11-08 ENCOUNTER — DOCUMENTATION ONLY (OUTPATIENT)
Dept: ENDOCRINOLOGY | Age: 45
End: 2020-11-08

## 2020-11-08 NOTE — PROGRESS NOTES
Reviewed all information when Lilian Law got approved last year. Karlie Harris prior authorization got  in 2020 , and it takes some time to get it all be approved and taken care of because of the new labs they are requesting and above all because of the pandemic    I told patient not to start pravastatin for cholesterol until the Lilian Law is started that as it is getting delayed now, she can go on to Pravachol      Please call patient and inform that I need all the lab tests like we did before. We have given already some lab orders but double check that she has the rest  1. Salivary cortisol checks at random times  2.  24-hour urine free cortisol  3.  A.m.  cortisol after Decadron test which will be the third 1 because the Decadron can interfere with above  2 tests      Chantell Rodrigues MD

## 2020-11-09 DIAGNOSIS — E11.65 TYPE 2 DIABETES MELLITUS WITH HYPERGLYCEMIA, WITHOUT LONG-TERM CURRENT USE OF INSULIN (HCC): Primary | ICD-10-CM

## 2020-11-09 DIAGNOSIS — E78.2 MIXED HYPERLIPIDEMIA: ICD-10-CM

## 2020-11-09 DIAGNOSIS — D49.7 ADRENAL TUMOR: ICD-10-CM

## 2020-11-09 DIAGNOSIS — Z79.4 ENCOUNTER FOR LONG-TERM (CURRENT) USE OF INSULIN (HCC): ICD-10-CM

## 2020-11-11 LAB
METANEPH FREE SERPL-MCNC: 10.1 PG/ML (ref 0–88)
NORMETANEPHRINE SERPL-MCNC: 45.8 PG/ML (ref 0–125.8)

## 2020-11-13 ENCOUNTER — TELEPHONE (OUTPATIENT)
Dept: ENDOCRINOLOGY | Age: 45
End: 2020-11-13

## 2020-11-16 RX ORDER — FLUCONAZOLE 150 MG/1
150 TABLET ORAL DAILY
Qty: 1 TAB | Refills: 0 | Status: SHIPPED | OUTPATIENT
Start: 2020-11-16 | End: 2020-11-17

## 2020-11-16 NOTE — TELEPHONE ENCOUNTER
Spoke with pt this afternoon, reminded she needed to  lab order prior to chart notes and labs being sent for PA. Pt stated she will come  lab orders today.

## 2020-12-22 RX ORDER — DROSPIRENONE AND ETHINYL ESTRADIOL 0.02-3(28)
KIT ORAL
Qty: 84 TAB | Refills: 1 | Status: SHIPPED | OUTPATIENT
Start: 2020-12-22 | End: 2022-10-14 | Stop reason: SDUPTHER

## 2020-12-22 RX ORDER — BUPROPION HYDROCHLORIDE 150 MG/1
TABLET, EXTENDED RELEASE ORAL
Qty: 90 TAB | Refills: 4 | Status: SHIPPED | OUTPATIENT
Start: 2020-12-22 | End: 2022-10-16

## 2021-01-04 RX ORDER — BLOOD SUGAR DIAGNOSTIC
STRIP MISCELLANEOUS
Qty: 200 STRIP | Refills: 6 | Status: SHIPPED | OUTPATIENT
Start: 2021-01-04 | End: 2022-10-14 | Stop reason: SDUPTHER

## 2021-03-15 DIAGNOSIS — E11.65 TYPE 2 DIABETES MELLITUS WITH HYPERGLYCEMIA, WITHOUT LONG-TERM CURRENT USE OF INSULIN (HCC): ICD-10-CM

## 2021-03-15 RX ORDER — INSULIN DEGLUDEC INJECTION 100 U/ML
INJECTION, SOLUTION SUBCUTANEOUS
Qty: 15 ML | Refills: 2 | Status: SHIPPED | OUTPATIENT
Start: 2021-03-15 | End: 2022-10-14 | Stop reason: SDUPTHER

## 2021-03-15 RX ORDER — INSULIN ASPART 100 [IU]/ML
8 INJECTION, SOLUTION INTRAVENOUS; SUBCUTANEOUS
Qty: 15 ML | Refills: 2 | Status: SHIPPED | OUTPATIENT
Start: 2021-03-15 | End: 2022-10-14 | Stop reason: SDUPTHER

## 2021-03-18 LAB
ALDOST SERPL-MCNC: 8.7 NG/DL (ref 0–30)
ALDOST/RENIN PLAS-RTO: 4.1 {RATIO} (ref 0–30)
RENIN PLAS-CCNC: 2.14 NG/ML/HR (ref 0.17–5.38)

## 2021-06-05 RX ORDER — PRAVASTATIN SODIUM 20 MG/1
TABLET ORAL
Qty: 90 TABLET | Refills: 2 | Status: SHIPPED | OUTPATIENT
Start: 2021-06-05 | End: 2022-10-14 | Stop reason: SDUPTHER

## 2021-06-06 RX ORDER — METFORMIN HYDROCHLORIDE 500 MG/1
TABLET ORAL
Qty: 180 TABLET | Refills: 2 | Status: SHIPPED | OUTPATIENT
Start: 2021-06-06 | End: 2022-10-14 | Stop reason: ALTCHOICE

## 2022-03-18 PROBLEM — E24.0 CUSHING DISEASE (HCC): Status: ACTIVE | Noted: 2020-02-04

## 2022-03-19 PROBLEM — L03.90 CELLULITIS: Status: ACTIVE | Noted: 2020-02-04

## 2022-03-19 PROBLEM — Z98.84 S/P BARIATRIC SURGERY: Status: ACTIVE | Noted: 2018-04-16

## 2022-10-14 ENCOUNTER — OFFICE VISIT (OUTPATIENT)
Dept: ENDOCRINOLOGY | Age: 47
End: 2022-10-14
Payer: COMMERCIAL

## 2022-10-14 VITALS
SYSTOLIC BLOOD PRESSURE: 149 MMHG | BODY MASS INDEX: 38.74 KG/M2 | DIASTOLIC BLOOD PRESSURE: 83 MMHG | RESPIRATION RATE: 20 BRPM | HEART RATE: 99 BPM | TEMPERATURE: 98.3 F | WEIGHT: 286 LBS | HEIGHT: 72 IN | OXYGEN SATURATION: 97 %

## 2022-10-14 DIAGNOSIS — I10 ESSENTIAL HYPERTENSION: ICD-10-CM

## 2022-10-14 DIAGNOSIS — E78.2 MIXED HYPERLIPIDEMIA: ICD-10-CM

## 2022-10-14 DIAGNOSIS — D49.7 ADRENAL TUMOR: ICD-10-CM

## 2022-10-14 DIAGNOSIS — E78.2 MIXED HYPERLIPIDEMIA: Primary | ICD-10-CM

## 2022-10-14 DIAGNOSIS — Z98.84 S/P BARIATRIC SURGERY: ICD-10-CM

## 2022-10-14 DIAGNOSIS — E11.65 TYPE 2 DIABETES MELLITUS WITH HYPERGLYCEMIA, WITHOUT LONG-TERM CURRENT USE OF INSULIN (HCC): Primary | ICD-10-CM

## 2022-10-14 DIAGNOSIS — E11.65 TYPE 2 DIABETES MELLITUS WITH HYPERGLYCEMIA, WITHOUT LONG-TERM CURRENT USE OF INSULIN (HCC): ICD-10-CM

## 2022-10-14 DIAGNOSIS — Z79.4 ENCOUNTER FOR LONG-TERM (CURRENT) USE OF INSULIN (HCC): ICD-10-CM

## 2022-10-14 PROCEDURE — 99214 OFFICE O/P EST MOD 30 MIN: CPT | Performed by: INTERNAL MEDICINE

## 2022-10-14 RX ORDER — DEXAMETHASONE 0.5 MG/1
TABLET ORAL
Qty: 2 TABLET | Refills: 0 | Status: SHIPPED | OUTPATIENT
Start: 2022-10-14

## 2022-10-14 RX ORDER — DROSPIRENONE AND ETHINYL ESTRADIOL 0.02-3(28)
KIT ORAL
Qty: 84 TABLET | Refills: 0 | Status: SHIPPED | OUTPATIENT
Start: 2022-10-14

## 2022-10-14 RX ORDER — PRAVASTATIN SODIUM 20 MG/1
20 TABLET ORAL
Qty: 90 TABLET | Refills: 2 | Status: SHIPPED | OUTPATIENT
Start: 2022-10-14

## 2022-10-14 RX ORDER — INSULIN DEGLUDEC 100 U/ML
INJECTION, SOLUTION SUBCUTANEOUS
Qty: 15 ML | Refills: 2 | Status: SHIPPED | OUTPATIENT
Start: 2022-10-14 | End: 2022-10-17 | Stop reason: SDUPTHER

## 2022-10-14 RX ORDER — BLOOD SUGAR DIAGNOSTIC
STRIP MISCELLANEOUS
Qty: 200 STRIP | Refills: 6 | Status: SHIPPED | OUTPATIENT
Start: 2022-10-14

## 2022-10-14 RX ORDER — BLOOD-GLUCOSE METER
EACH MISCELLANEOUS
Qty: 1 EACH | Refills: 0 | Status: SHIPPED | OUTPATIENT
Start: 2022-10-14

## 2022-10-14 RX ORDER — INSULIN ASPART 100 [IU]/ML
INJECTION, SOLUTION INTRAVENOUS; SUBCUTANEOUS
Qty: 15 ML | Refills: 2 | Status: SHIPPED | OUTPATIENT
Start: 2022-10-14 | End: 2022-10-17 | Stop reason: SDUPTHER

## 2022-10-14 RX ORDER — SEMAGLUTIDE 1.34 MG/ML
INJECTION, SOLUTION SUBCUTANEOUS
Qty: 2 BOX | Refills: 6 | Status: SHIPPED | OUTPATIENT
Start: 2022-10-14

## 2022-10-14 RX ORDER — FLASH GLUCOSE SENSOR
KIT MISCELLANEOUS
Qty: 2 KIT | Refills: 5 | Status: SHIPPED | OUTPATIENT
Start: 2022-10-14

## 2022-10-14 NOTE — PATIENT INSTRUCTIONS
- 1st set - only fasting       - second set -    Decadron  1 mg @ 11 pm    -  second  lab   fasting next day       --------------------------------------------------------------------------------------------------------------        SPECIFIC INSTRUCTIONS BELOW     pravachol 20 mg at night  ( do not start  Until next visit )      OZEMPIC   1 MG A WEEK        Check blood sugars right before each meal and at bed time        [place]   insulin 50  units at bed time      Stay  novolog  At    2 units   For every  10 gm  Of carbs      Add additional novolog by sliding scale  as folllows with meals:    150-200 mg 1 units    201-250 mg 3  units    251-300 mg 5 units    301-350 mg 7 units    351-400 mg 9 units    401-450 mg 11 units    451-500 mg 13 units    Less than 70 mg NO INSULIN         -------------PAY ATTENTION TO THESE GENERAL INSTRUCTIONS -----------------      - The medications prescribed at this visit will not be available at pharmacy until 6 pm       - YOUR MED LIST IS NOT UP TO DATE AS SOME CHANGES ARE BEING MADE AFTER THE VISIT - FOLLOW SPECIFIC INSTRUCTIONS  ABOVE     -ANY tests other than blood work, which you opt to do  outside the  [facility] imaging facilities, you are responsible for prior authorizations if  required    - 18 Karolina Arzate UP TO DATE ON YOUR AVS- PLEASE IGNORE     Results     *Normal results will not be notified by a phone call starting January 1 2021   *If you have an upcoming visit, the results will be discussed at the visit   *Please sign up for MY CHART if you want access to your lab and test results  *Abnormal results which require immediate attention will be notified by phone call   *Abnormal results which do not require immediate assistance will be notified in 1-2 weeks       Refills    -    have your pharmacy send us a refill request . Refills are done max for one year and a visit is a must before refills are extended    Follow up appointments -  highly encourage you to make it when you are checking out. We can accommodate you into the schedule based on your clinical situation, but not for extending refills beyond a year. Labs are important to give refills and is important to get labs before the visit     Phone calls  -  Allow  24 hrs.  for non-urgent calls to be returned  Prior authorization - It may take 2-4 weeks to process  Forms  -  FMLA, DMV etc., will take up to 2 weeks to process  Cancellations - please notify the office 2 days in advance   Samples  - will only be dispensed at visits       If not showing for the appointments and cancelling appointments within 24 hours are kept track of and three  of such situations in  two consecutive years will likely be considered for termination from the practice    -------------------------------------------------------------------------------------------------------------------

## 2022-10-14 NOTE — PROGRESS NOTES
[address]Nicole D Khadijah Mahoney is a 56 y.o. female here for   Chief Complaint   Patient presents with    Diabetes    Adrenal Problem       1. Have you been to the ER, urgent care clinic since your last visit? Hospitalized since your last visit? -no    2. Have you seen or consulted any other health care providers outside of the [address] [place] [address] since your last visit?   Include any pap smears or colon screening.-no

## 2022-10-14 NOTE — PROGRESS NOTES
HISTORY OF PRESENT ILLNESS  Rl Ramirez is a  56  y.o. female. HPI  Patient here for f/u visit for Type 2 diabetes mellitus   And  for adrenal nodule   after last visit from  october 2020    COMES in after 2 years of  GAP , asking for resuming Vert[facility] Millers   She got wed and is planning  on a trip . She is  requesting chaz  2   and  refills on  OCP     No meter in hand - sugars unsure of in control or  not   NO LABS       October 2020     Pt is here to resume  korlym   Pt wants to resume Karen Brisker  - because she is unable to lose weight , and DM is worser  And  No longer worried about pregnancy   She felt it was helping her with weight loss and diabetes   She stopped Korlym  for amenorrhea  In April / May 2020   She says she does not want to consider pregnancy any more        March 2020 : She is taking Korlym   Last menstrual cycle was in jan 2020   She is taking OCP         Old history      A long GAP    Pt is emotional   She had lost her mother  And is quite depressed   Gained 14 lbs   She wants to do better         Old history   Has not followed up for more than a year     She is s/p  bariatric surgery done , gastric sleeve , by Dr. Cristiana Manley on oct 17 2016   She lost 45 lbs post surgery   She had 15 lbs since last visit   She had  Multiple infections, the major one was cholycystectomy   Had multiple imaging test done , and that showed incidental nodule on adrenal glands   Every time she joins the gym, she fractures   She has charcots joints       Review of Systems   Constitutional: Negative. Psychiatric/Behavioral: Negative for depression and memory loss. The patient does not have insomnia. Physical Exam   Constitutional: She is oriented to person, place, and time. She appears well-developed and well-nourished. HENT: Body mass index is 37.73 kg/m². Psychiatric: She has a normal mood and affect. NO LABS       ASSESSMENT and PLAN    1.  Type 2 DM, uncontrolled : A1c is   7.9 %     From     October 2020    Compared to   6.6 %    From    March 2020    Compared to   9.9 %   From oct 2019   compared to      9.3 %      From      Feb 2018    Compared to    7.5 %      From  Oct 2017      Compared to   7.2 %  Today   From  Oct 2016 compared to   8.5 %     From   March 2016  Compared to 8.4 %   From dec 2015   compared to    10.2 %     From oct 2015 comapred to  7.4 %  From June 2014 compared to  9.6 % from MArch 2014 compared to  7.7 % march 2013  Compared to  9.9 % March 2012 compared to 8.7% in march 2010 October 2022   Refilled her insulins - a gap of 2 years   No labs   Gave sample [facility] sensor   Refilled ozempic     Mendez Aguilar   is being seen for DM type 2  Patient  performs 4 times of blood glucose checks a day utilizing the home BGM. Patient  uses  subcutaneous 4  Insulin  shots  to treat  diabetes. Patient does adjustments to the regimen by sliding scale or bolus wizard  on the basis of therapeutic CGM testing results          Oct 2020      Stay on Tresiba  and    meal time insulin   She did not bring her meter to  the glycemic insight again this visit too  Stay On metformin 1000 mg  2 pills   a day   Stay on OZEMPIC to 1 mg a week         March 2020  : She is s/p gastric sleeve surgery  -  Oct 2016   She has measurable c-pep , definitely type 2 diabetic    (? Normoglycemic DKA , unexpected on her hospitalization  At [facility] while on [facility] )  Post bariatric surgery , she had to be on insulin     Stay on Lantus   meal time insulin   She did not bring her meter to  the glycemic insight again this visit too  Off  Bydureon  On metformin 1000 mg  2 pills   a day   Increased  OZEMPIC to 1 mg a week         2. Hypoglycemia : Educated on treating the hypoglycemia. 3. HTN : ?   Use  benicar- hctz,    4. Dyslipidemia :  ? Pravachol use    5. use of aspirin to prevent MI and TIA's discussed     6. PCOS- might refill spironolactone later , did refills on OCP temporarily     7.  Obesity : Body mass index is 37.73 kg/m². S/p gastric sleeve surgery      8. Adrenal nodule : Incidental finding when CT was done for gallbladder problems  -  Reviewed records    explained the management plan by size, radiology phenotype and active   Salivary cortisols pending   Urine 24 hr  Free cortiosl : 54   1 mg AM  Decadron :  Done twice, and  both mornings the suppressed  cortisol was 2 in AM  after 1 mg DST     Adrenal CT  2018  : Showed 2 nodules on left side  , superior one has a phenotype of   2.3 cm 5 HU   ;   1.2 cm myelo lipoma  -HU   Also showed severe spinal stenosis . Right adrenal is normal .    There is a possibility of  sub-clinical cushings disease  She had unsuppressed  >1.8  Cortisol    on 1 mg DST  , repeated twice in 2018 and may 2018   She had suppressed  0.9 cortisol  on 1 mg DST     She has hypercortisolism by labs and physical appearance     October CT  Scan      :  ADRENALS: No significant change in 2.1 x 2.6 cm superior limb left adrenal  adenoma, 1.5 x 1.6 cm left adrenal body adenoma, or 1.3 cm lower limb left  adrenal myelolipoma. Right adrenal remains unremarkable. .    Pt  Started  On  Korlym  2020  -  Low dose 200 mg a day  and increased to  Next dose  300 mg a day    A month later and she stopped it in 2020     Started on spironolactone and is on OCP  Dec 2019 which was increased later to  Go with korlym     I started her on  kORLYM 2020,    AFTER CT SCAN AS A TRIAL TO SEE IF THERE IS CLINICAL IMPROVEMENT and patient used it and then stopped it  For amenorrhea     As there are 2 nodules on the left side , there is a possibility of micro nodules on  Right gland   this needs clarification by adrenal vein sampling in case of  lap adrenalectomy is  opted       Oct 2020 : She wants to resume  Merle Oakley ;  Will do labs   Submitted PA to [facility], but her PA got  in 2020  ( spironolactone 100 mg twice a day    When korlym starts )       Oct 2022 : she wants to [facility] and I needed time to reevaluate everything because of a gap of more than 2 years        9. CHARCOTS JOINT ARHROPATHY - developed ulcer on right foot, hospitalized and recovered , going in for orthotic fit .  Sees Foot  And ankle specialist        Non compliant in follow ups -   Ordering labs - start fresh        reviewed med actions, pros and cons of each choice  And  spent more than 25 min in interpretation and counseling     Total time  : 42 min         Reviewed results with patient and discussed the labs being ordered today/bnv  Patient voiced understanding of plan of care         F/u  in 2  months

## 2022-10-17 DIAGNOSIS — E78.2 MIXED HYPERLIPIDEMIA: ICD-10-CM

## 2022-10-17 DIAGNOSIS — E11.65 TYPE 2 DIABETES MELLITUS WITH HYPERGLYCEMIA, WITHOUT LONG-TERM CURRENT USE OF INSULIN (HCC): ICD-10-CM

## 2022-10-17 RX ORDER — INSULIN DEGLUDEC 100 U/ML
INJECTION, SOLUTION SUBCUTANEOUS
Qty: 15 ML | Refills: 2 | Status: SHIPPED | OUTPATIENT
Start: 2022-10-17

## 2022-10-17 RX ORDER — INSULIN ASPART 100 [IU]/ML
INJECTION, SOLUTION INTRAVENOUS; SUBCUTANEOUS
Qty: 15 ML | Refills: 2 | Status: SHIPPED | OUTPATIENT
Start: 2022-10-17

## 2022-11-10 DIAGNOSIS — E78.2 MIXED HYPERLIPIDEMIA: ICD-10-CM

## 2022-11-10 DIAGNOSIS — D49.7 ADRENAL TUMOR: ICD-10-CM

## 2022-11-10 DIAGNOSIS — E11.65 TYPE 2 DIABETES MELLITUS WITH HYPERGLYCEMIA, WITHOUT LONG-TERM CURRENT USE OF INSULIN (HCC): Primary | ICD-10-CM

## 2022-11-10 DIAGNOSIS — Z79.4 ENCOUNTER FOR LONG-TERM (CURRENT) USE OF INSULIN (HCC): ICD-10-CM

## 2022-11-10 RX ORDER — DROSPIRENONE AND ETHINYL ESTRADIOL 0.02-3(28)
KIT ORAL
Qty: 84 TABLET | Refills: 0 | Status: SHIPPED | OUTPATIENT
Start: 2022-11-10

## 2022-11-10 RX ORDER — DEXAMETHASONE 1 MG/1
TABLET ORAL
Qty: 1 TABLET | Refills: 0 | Status: SHIPPED | OUTPATIENT
Start: 2022-11-10 | End: 2022-11-20

## 2022-11-10 NOTE — TELEPHONE ENCOUNTER
Patient need decadron sent back in asap I have set her up for Monday again as at first she said they had to order now stating order never sent in though verified to [facility] on October with her insulins. Very unclear of confusion.  Can we please get sent in stat

## 2022-11-16 ENCOUNTER — OFFICE VISIT (OUTPATIENT)
Dept: ENDOCRINOLOGY | Age: 47
End: 2022-11-16
Payer: COMMERCIAL

## 2022-11-16 VITALS
BODY MASS INDEX: 39.28 KG/M2 | SYSTOLIC BLOOD PRESSURE: 151 MMHG | WEIGHT: 290 LBS | TEMPERATURE: 97.8 F | OXYGEN SATURATION: 98 % | HEART RATE: 90 BPM | DIASTOLIC BLOOD PRESSURE: 82 MMHG | RESPIRATION RATE: 20 BRPM | HEIGHT: 72 IN

## 2022-11-16 DIAGNOSIS — E11.65 TYPE 2 DIABETES MELLITUS WITH HYPERGLYCEMIA, WITHOUT LONG-TERM CURRENT USE OF INSULIN (HCC): Primary | ICD-10-CM

## 2022-11-16 DIAGNOSIS — E78.2 MIXED HYPERLIPIDEMIA: ICD-10-CM

## 2022-11-16 DIAGNOSIS — E11.65 TYPE 2 DIABETES MELLITUS WITH HYPERGLYCEMIA, WITHOUT LONG-TERM CURRENT USE OF INSULIN (HCC): ICD-10-CM

## 2022-11-16 DIAGNOSIS — Z79.4 ENCOUNTER FOR LONG-TERM (CURRENT) USE OF INSULIN (HCC): ICD-10-CM

## 2022-11-16 DIAGNOSIS — D49.7 ADRENAL TUMOR: ICD-10-CM

## 2022-11-16 DIAGNOSIS — I10 ESSENTIAL HYPERTENSION: ICD-10-CM

## 2022-11-16 DIAGNOSIS — Z98.84 S/P BARIATRIC SURGERY: ICD-10-CM

## 2022-11-16 PROCEDURE — 3074F SYST BP LT 130 MM HG: CPT | Performed by: INTERNAL MEDICINE

## 2022-11-16 PROCEDURE — 99214 OFFICE O/P EST MOD 30 MIN: CPT | Performed by: INTERNAL MEDICINE

## 2022-11-16 PROCEDURE — 3078F DIAST BP <80 MM HG: CPT | Performed by: INTERNAL MEDICINE

## 2022-11-16 PROCEDURE — 3046F HEMOGLOBIN A1C LEVEL >9.0%: CPT | Performed by: INTERNAL MEDICINE

## 2022-11-16 RX ORDER — INSULIN DEGLUDEC 100 U/ML
INJECTION, SOLUTION SUBCUTANEOUS
Qty: 30 ML | Refills: 3 | Status: SHIPPED | OUTPATIENT
Start: 2022-11-16

## 2022-11-16 RX ORDER — INSULIN ASPART 100 [IU]/ML
INJECTION, SOLUTION INTRAVENOUS; SUBCUTANEOUS
Qty: 30 ML | Refills: 3 | Status: SHIPPED | OUTPATIENT
Start: 2022-11-16

## 2022-11-16 RX ORDER — BLOOD SUGAR DIAGNOSTIC
STRIP MISCELLANEOUS
Qty: 400 STRIP | Refills: 3 | Status: SHIPPED | OUTPATIENT
Start: 2022-11-16

## 2022-11-16 RX ORDER — SEMAGLUTIDE 1.34 MG/ML
INJECTION, SOLUTION SUBCUTANEOUS
Qty: 6 BOX | Refills: 3 | Status: SHIPPED | OUTPATIENT
Start: 2022-11-16

## 2022-11-16 RX ORDER — PRAVASTATIN SODIUM 20 MG/1
20 TABLET ORAL
Qty: 90 TABLET | Refills: 2 | Status: SHIPPED | OUTPATIENT
Start: 2022-11-16

## 2022-11-16 RX ORDER — BLOOD-GLUCOSE METER
EACH MISCELLANEOUS
Qty: 1 EACH | Refills: 0 | Status: SHIPPED | OUTPATIENT
Start: 2022-11-16

## 2022-11-16 NOTE — PROGRESS NOTES
Nyla Moulton is a 56 y.o. female here for   Chief Complaint   Patient presents with    Diabetes       1. Have you been to the ER, urgent care clinic since your last visit? Hospitalized since your last visit? -no    2. Have you seen or consulted any other health care providers outside of the [address] [place] [address] since your last visit?   Include any pap smears or colon screening.-no

## 2022-11-16 NOTE — LETTER
11/20/2022    Patient: Magdalene Mc   YOB: 1975   Date of Visit: 11/16/2022     Eder Townsend MD  [address] [address]  Via Fax: [phone]    Dear Eder Townsend MD,      Thank you for referring Ms. Jorge Nettles to [facility] for evaluation. My notes for this consultation are attached. If you have questions, please do not hesitate to call me. I look forward to following your patient along with you.       Sincerely,    Nelly Laguerre MD

## 2022-11-16 NOTE — PROGRESS NOTES
HISTORY OF PRESENT ILLNESS      Anastasia Kennedy is a  56  y.o. female. HPI  Patient here for  ONE  MONTH  f/u visit for Type 2 diabetes mellitus , s/p Gastric bypass  and  adrenal nodule   after last visit from  october 2022    She is s/p  bariatric surgery done , gastric sleeve  by Dr. Ariel Benavides on oct 17 2016   She has no meter , Randene Boop was not used   Had labs as requested         October 2022     COMES in after 2 years of  GAP , asking for resuming [place]   She got wed and is planning  on a trip . She is  requesting chaz  2   and  refills on  OCP   No meter in hand - sugars unsure of in control or  not   NO LABS       October 2020     Pt is here to resume  korlym   Pt wants to resume Charlann Fairly  - because she is unable to lose weight , and DM is worser  And  No longer worried about pregnancy   She felt it was helping her with weight loss and diabetes   She stopped Korlym  for amenorrhea  In April / May 2020   She says she does not want to consider pregnancy any more  She is s/p  bariatric surgery done , gastric sleeve , by Dr. Ariel Benavides on oct 17 2016   She lost 45 lbs post surgery   She had 15 lbs since last visit   She had  Multiple infections, the major one was cholycystectomy   Had multiple imaging test done , and that showed incidental nodule on adrenal glands   Every time she joins the gym, she fractures   She has charcots joints       Review of Systems   Constitutional: Negative. Psychiatric/Behavioral: Negative for depression and memory loss. The patient does not have insomnia. Physical Exam   Constitutional: She is oriented to person, place, and time. She appears well-developed and well-nourished. HENT: Body mass index is 38.26 kg/m². Psychiatric: She has a normal mood and affect. Labs reviewed       ASSESSMENT and PLAN    1.  Type 2 DM, uncontrolled : A1c is  11.3 %  from  nov 2022  compared to  7.9 %     From     October 2020    Compared to   6.6 %    From    March 2020    Compared to   9.9 % From oct 2019   compared to      9.3 %      From      Feb 2018    Compared to    7.5 %      From  Oct 2017      Compared to   7.2 %  Today   From  Oct 2016 compared to   8.5 %     From   March 2016  Compared to 8.4 %   From dec 2015   compared to    10.2 %     From oct 2015 comapred to  7.4 %  From June 2014 compared to  9.6 % from MArch 2014 compared to  7.7 % march 2013  Compared to  9.9 % March 2012 compared to 8.7% in march 2010 Nov 2022   LOST control of glycemic control   She is given meter, can use [employer] on basal bolus regimen  and  ozempic       October 2022   Refilled her insulins - a gap of 2 years   No labs   Gave sample Mad[employer] Andrews sensor   Refilled ozempic       Oct 2020      Stay on Tresiba  and    meal time insulin   She did not bring her meter to  the glycemic insight again this visit too  Stay On metformin 1000 mg  2 pills   a day   Stay on OZEMPIC to 1 mg a week         March 2020  : She is s/p gastric sleeve surgery  -  Oct 2016   She has measurable c-pep , definitely type 2 diabetic  (? Normoglycemic DKA , unexpected on her hospitalization  At [facility] while on [facility] )  Post bariatric surgery , she had to be on insulin   Stay on Lantus   meal time insulin   She did not bring her meter to  the glycemic insight again this visit too  Off  Bydureon  On metformin 1000 mg  2 pills   a day   Increased  OZEMPIC to 1 mg a week     2. Hypoglycemia : Educated on treating the hypoglycemia. 3. HTN : ?   Use  benicar- hctz,    4. Dyslipidemia :  ? Pravachol use    5. use of aspirin to prevent MI and TIA's discussed     6. PCOS- might refill spironolactone later , did refills on OCP temporarily     7. Obesity :   Body mass index is 38.26 kg/m². S/p gastric sleeve surgery- oct 2016       8. 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 Right adrenal remains unremarkable. .    Pt  Started  On  Korlym  2020  -  Low dose 200 mg a day  and increased to  Next dose  300 mg a day    A month later and she stopped it in 2020     Started on spironolactone and is on OCP  Dec 2019 which was increased later to  Go with korlym   I started her on  kORLYM 2020,    AFTER CT SCAN AS A TRIAL TO SEE IF THERE IS CLINICAL IMPROVEMENT and patient used it and then stopped it  For amenorrhea     As there are 2 nodules on the left side , there is a possibility of micro nodules on  Right gland   this needs clarification by adrenal vein sampling in case of  lap adrenalectomy is  opted       Oct 2020 : She wants to resume  Gal Murphy ; Will do labs   Submitted PA to [facility], but her PA got  in 2020  ( spironolactone 100 mg twice a day    When korlym starts )       Oct 2022 : she wants to [facility] and I needed time to reevaluate everything because of a gap of more than 2 years       2022  : 1 mg DST is negative this time and cannot start Korlym  ACTH low though   Ordering CT adrenals            9. CHARCOTS JOINT ARHROPATHY - developed ulcer on right foot, hospitalized and recovered , going in for orthotic fit .        Reviewed results with patient and discussed the labs being ordered today/bnv  Patient voiced understanding of plan of care         F/u  in 2  months

## 2022-11-16 NOTE — PATIENT INSTRUCTIONS
SPECIFIC INSTRUCTIONS BELOW     Instructions for salivary cortisol test     1. Do not brush teeth or floss  before collecting specimen. 2. Do not eat or drink for 30 minutes prior to specimen collection. 3. 24 hours before collection - do not use any creams or lotion that contains steroids such as         hydrocortisone or use any steroid inhalers. These products may contaminate the absorbent. 4. Avoid activities that may cause gum bleeding before collection        Night 1- Put the absorbent pad under your tongue between 11p-midnight for four (4) minutes, label with name, date of birth, collection date and collection time. Place pad in the freezer. Night 5- Put the absorbent pad under your tongue between 11p-midnight for four (4) minutes, label with name, date of birth, collection date and collection time. Place pad in the freezer. Absorbent pads are to remain frozen until you can drop them off to [facility] with orders or our lab in the office.     -------------------------------------------------------------------------------------------------      pravachol 20 mg at night        OZEMPIC   1 MG A WEEK        Check blood sugars right before each meal and at bed time        Dane    insulin 50  units at bed time      Stay  novolog  At    2 units   For every  10 gm  Of carbs      Add additional novolog by sliding scale  as folllows with meals:    150-200 mg 1 units    201-250 mg 3  units    251-300 mg 5 units    301-350 mg 7 units    351-400 mg 9 units    401-450 mg 11 units    451-500 mg 13 units    Less than 70 mg NO INSULIN                   -------------PAY ATTENTION TO THESE GENERAL INSTRUCTIONS -----------------      - The medications prescribed at this visit will not be available at pharmacy until 6 pm       - YOUR MED LIST IS NOT UP TO DATE AS SOME CHANGES ARE BEING MADE AFTER THE VISIT - FOLLOW SPECIFIC INSTRUCTIONS  ABOVE     -ANY tests other than blood work, which you opt to do  outside the [facility] imaging facilities, you are responsible for prior authorizations if  required    - 18 Karolina Arzate UP TO DATE ON YOUR AVS- PLEASE IGNORE     Results     *Normal results will not be notified by a phone call starting January 1 2021   *If you have an upcoming visit, the results will be discussed at the visit   *Please sign up for MY CHART if you want access to your lab and test results  *Abnormal results which require immediate attention will be notified by phone call   *Abnormal results which do not require immediate assistance will be notified in 1-2 weeks       Refills    -    have your pharmacy send us a refill request . Refills are done max for one year and a visit is a must before refills are extended    Follow up appointments -  highly encourage you to make it when you are checking out. We can accommodate you into the schedule based on your clinical situation, but not for extending refills beyond a year. Labs are important to give refills and is important to get labs before the visit     Phone calls  -  Allow  24 hrs.  for non-urgent calls to be returned  Prior authorization - It may take 2-4 weeks to process  Forms  -  FMLA, DMV etc., will take up to 2 weeks to process  Cancellations - please notify the office 2 days in advance   Samples  - will only be dispensed at visits       If not showing for the appointments and cancelling appointments within 24 hours are kept track of and three  of such situations in  two consecutive years will likely be considered for termination from the practice    -------------------------------------------------------------------------------------------------------------------

## 2022-11-21 DIAGNOSIS — E24.0 CUSHING DISEASE (HCC): Primary | ICD-10-CM

## 2022-11-21 DIAGNOSIS — D49.7 ADRENAL TUMOR: ICD-10-CM

## 2023-12-07 ENCOUNTER — TELEPHONE (OUTPATIENT)
Age: 48
End: 2023-12-07

## 2023-12-07 DIAGNOSIS — Z79.4 TYPE 2 DIABETES MELLITUS WITH HYPERGLYCEMIA, WITH LONG-TERM CURRENT USE OF INSULIN (HCC): Primary | ICD-10-CM

## 2023-12-07 DIAGNOSIS — E11.65 TYPE 2 DIABETES MELLITUS WITH HYPERGLYCEMIA, WITH LONG-TERM CURRENT USE OF INSULIN (HCC): Primary | ICD-10-CM

## 2023-12-07 NOTE — TELEPHONE ENCOUNTER
Pt called for routine appt. Last time she was here was 11-, and no PCP has helped her with monitoring her DM. Does she need to have lab work prior to her appt in February.

## 2023-12-07 NOTE — TELEPHONE ENCOUNTER
She  Is a  non compliant  with  follow ups     Yes, she needs  diabetes labs  - DM 4  along with fasting cpep level    Where does  she want the labs to go           Renea Jon MD